# Patient Record
Sex: MALE | Race: AMERICAN INDIAN OR ALASKA NATIVE
[De-identification: names, ages, dates, MRNs, and addresses within clinical notes are randomized per-mention and may not be internally consistent; named-entity substitution may affect disease eponyms.]

---

## 2018-07-11 ENCOUNTER — HOSPITAL ENCOUNTER (EMERGENCY)
Dept: HOSPITAL 5 - ED | Age: 58
Discharge: HOME | End: 2018-07-11
Payer: COMMERCIAL

## 2018-07-11 VITALS — DIASTOLIC BLOOD PRESSURE: 89 MMHG | SYSTOLIC BLOOD PRESSURE: 131 MMHG

## 2018-07-11 DIAGNOSIS — I10: ICD-10-CM

## 2018-07-11 DIAGNOSIS — M62.838: Primary | ICD-10-CM

## 2018-07-11 DIAGNOSIS — Z87.891: ICD-10-CM

## 2018-07-11 LAB
BUN SERPL-MCNC: 18 MG/DL (ref 9–20)
BUN/CREAT SERPL: 16 %
CALCIUM SERPL-MCNC: 10.2 MG/DL (ref 8.4–10.2)
HEMOLYSIS INDEX: 19

## 2018-07-11 PROCEDURE — 99283 EMERGENCY DEPT VISIT LOW MDM: CPT

## 2018-07-11 PROCEDURE — 80048 BASIC METABOLIC PNL TOTAL CA: CPT

## 2018-07-11 PROCEDURE — 36415 COLL VENOUS BLD VENIPUNCTURE: CPT

## 2018-07-11 PROCEDURE — 83735 ASSAY OF MAGNESIUM: CPT

## 2018-07-11 NOTE — EMERGENCY DEPARTMENT REPORT
ED General Adult HPI





- General


Chief complaint: Pain General


Stated complaint: FULL BODY SPASM


Time Seen by Provider: 18 12:47


Source: patient


Mode of arrival: Ambulatory


Limitations: No Limitations





- History of Present Illness


Initial comments: 





This is a 58-year-old male here complaining of generalized spasm for 2 days.  

He reports that he has had this in the past and he has been followed by Dr. Nicole who gave him vitamin D.  He said that this is something that he gets 

periodically.  Patient states that he drinks more than 2 L of water daily.  He 

reports that when he has as before he had blood work done at Dr. Nicole's office 

and reports that everything was normal.  Patient states that he does a lot of 

heavy lifting at work and he thinks this was causing this.  Spasms intermittent 

that feels tight and 7 out of 10.  No medication taken prior to coming to the 

emergency room.


MD Complaint: Generalized muscle spasm


Onset/Timin


-: days(s)


Location: neck, back, upper extremity, lower extremity


Radiation: non-radiation


Severity scale (0 -10): 7


Quality: other (tight)


Improves with: rest


Worsens with: movement


Associated Symptoms: denies: confusion, chest pain, cough, diaphoresis, fever/

chills, headaches, loss of appetite, malaise, nausea/vomiting, rash, seizure, 

shortness of breath, syncope, weakness


Treatments Prior to Arrival: none





- Related Data


 Home Medications











 Medication  Instructions  Recorded  Confirmed  Last Taken


 


Lisinopril [Zestril TAB] 10 mg PO QDAY 16








 Previous Rx's











 Medication  Instructions  Recorded  Last Taken  Type


 


Azithromycin [Zithromax Z-RENATA] 1 dose PO DAILY 5 Days  tab 16 Unknown Rx


 


Benzonatate [Tessalon Perles] 100 mg PO Q8HR #30 capsule 16 Unknown Rx


 


Oxycodone HCl/Acetaminophen 1 each PO Q6HR PRN #20 tablet 16 Unknown Rx





[Percocet 10/325 mg]    


 


Cyclobenzaprine [Flexeril] 10 mg PO TID PRN #12 tablet 18 Unknown Rx











 Allergies











Allergy/AdvReac Type Severity Reaction Status Date / Time


 


ibuprofen [From Motrin] Allergy  Nausea Verified 16 08:49














ED Review of Systems


ROS: 


Stated complaint: FULL BODY SPASM


Other details as noted in HPI





Constitutional: denies: chills, fever


Eyes: denies: eye pain, eye discharge, vision change


ENT: denies: ear pain, throat pain


Respiratory: denies: cough, shortness of breath, SOB with exertion, SOB at rest

, stridor, wheezing


Cardiovascular: denies: chest pain, palpitations, edema, syncope


Endocrine: denies: excessive sweating, increased hunger, increased thirst, 

unexplained weight gain


Gastrointestinal: denies: abdominal pain, nausea, vomiting, diarrhea, 

constipation, hematemesis, melena, hematochezia


Genitourinary: denies: urgency, dysuria, frequency, hematuria, discharge


Musculoskeletal: denies: back pain, joint swelling, arthralgia


Skin: denies: rash, lesions


Neurological: denies: headache, weakness, numbness, paresthesias, confusion, 

abnormal gait, vertigo


Psychiatric: denies: anxiety, depression





ED Past Medical Hx





- Past Medical History


Previous Medical History?: Yes


Hx Hypertension: Yes


Hx Arthritis: Yes





- Surgical History


Past Surgical History?: No





- Family History


Family history: hypertension





- Social History


Smoking Status: Former Smoker


Substance Use Type: Prescribed





- Medications


Home Medications: 


 Home Medications











 Medication  Instructions  Recorded  Confirmed  Last Taken  Type


 


Azithromycin [Zithromax Z-RENATA] 1 dose PO DAILY 5 Days  tab 16  Unknown Rx


 


Benzonatate [Tessalon Perles] 100 mg PO Q8HR #30 capsule 16  Unknown Rx


 


Lisinopril [Zestril TAB] 10 mg PO QDAY 16 History


 


Oxycodone HCl/Acetaminophen 1 each PO Q6HR PRN #20 tablet 16  Unknown Rx





[Percocet 10/325 mg]     


 


Cyclobenzaprine [Flexeril] 10 mg PO TID PRN #12 tablet 18  Unknown Rx














ED Physical Exam





- General


Limitations: No Limitations


General appearance: alert, in no apparent distress





- Head


Head exam: Present: atraumatic, normocephalic, normal inspection





- Eye


Eye exam: Present: normal appearance, PERRL, EOMI


Pupils: Present: normal accommodation





- ENT


ENT exam: Present: normal exam, normal orophraynx, mucous membranes moist, TM's 

normal bilaterally, normal external ear exam





- Neck


Neck exam: Present: normal inspection, full ROM, other (no C-spine tenderness).

  Absent: tenderness, lymphadenopathy





- Respiratory


Respiratory exam: Present: normal lung sounds bilaterally.  Absent: respiratory 

distress, chest wall tenderness





- Cardiovascular


Cardiovascular Exam: Present: regular rate, normal rhythm, normal heart sounds.

  Absent: systolic murmur, diastolic murmur





- GI/Abdominal


GI/Abdominal exam: Present: soft, normal bowel sounds.  Absent: distended, 

tenderness, guarding, rebound, rigid, organomegaly, mass, bruit, pulsatile mass

, hernia





- Extremities Exam


Extremities exam: Present: normal inspection, full ROM, normal capillary refill

, other (no clubbing, cyanosis or edema.  +2 pulses.  Extremities and no 

neurovascular compromise).  Absent: tenderness, pedal edema, joint swelling, 

calf tenderness





- Back Exam


Back exam: Present: normal inspection, full ROM, other (ambulates without any 

difficulties).  Absent: tenderness, CVA tenderness (R), CVA tenderness (L), 

muscle spasm, paraspinal tenderness, vertebral tenderness, rash noted





- Neurological Exam


Neurological exam: Present: alert, oriented X3, normal gait, reflexes normal, 

other (No focal neurological deficit).  Absent: motor sensory deficit





- Psychiatric


Psychiatric exam: Present: normal affect, normal mood





- Skin


Skin exam: Present: warm, dry, intact, normal color.  Absent: rash





ED Course


 Vital Signs











  18





  11:14


 


Temperature 98 F


 


Pulse Rate 87


 


Respiratory 20





Rate 


 


Blood Pressure 131/89


 


O2 Sat by Pulse 99





Oximetry 














- Reevaluation(s)


Reevaluation #1: 





18 14:23


Patient received normal saline 1 L emergency room and he is feeling better.





ED Medical Decision Making





- Lab Data


Result diagrams: 


 18 12:55








 Lab Results











  18 Range/Units





  12:55 


 


Sodium  135 L  (137-145)  mmol/L


 


Potassium  4.7  (3.6-5.0)  mmol/L


 


Chloride  93.8 L  ()  mmol/L


 


Carbon Dioxide  25  (22-30)  mmol/L


 


Anion Gap  21  mmol/L


 


BUN  18  (9-20)  mg/dL


 


Creatinine  1.1  (0.8-1.5)  mg/dL


 


Estimated GFR  > 60  ml/min


 


BUN/Creatinine Ratio  16  %


 


Glucose  111 H  ()  mg/dL


 


Calcium  10.2  (8.4-10.2)  mg/dL


 


Magnesium  1.80  (1.7-2.3)  mg/dL














- Medical Decision Making





ED course:





He  reports that he is having generalized spasm 2 days.  He reports that he 

lives heavy objects at work and thinks that this why he is having spasm.  He's 

had similar incident in the past .  Follow-up by Dr. Nicole who is his primary 

care physician.  Here to be evaluated





Patient was seen and examined by myself .  BMP stable except sodium is 135 

which is mildly decrease, magnesium is normal.  I discussed the patient's 

diagnosis and treatment plan he was understanding and he is feeling better 

after IV fluid.





A/P


1: Spasm, Generalized-hydrated with normal saline 1 L and studies: Better.   

will discharge home on Flexeril





Patient headache and the lab work, diagnosis, treatment plan and medication.  

He was understanding





Patient discharged home in stable condition to follow up with his PCP in 2 

days.  VS stable and is afebrile.  Patient studies: Better.  I discussed with 

him to his condition worsens to return to the emergency room and he was 

understanding.  Patient discharged home in stable condition with prescription 

for Flexeril and to increase his fluid intake.








- Differential Diagnosis


electrolyte imbalance, generalized muscle spasm


Critical care attestation.: 


If time is entered above; I have spent that time in minutes in the direct care 

of this critically ill patient, excluding procedure time.








ED Disposition


Clinical Impression: 


 Spasm of muscle





Disposition: DC-01 TO HOME OR SELFCARE


Is pt being admited?: No


Does the pt Need Aspirin: No


Condition: Stable


Instructions:  Muscle Spasm (ED)


Additional Instructions: 


Please follow-up with your primary care physician in 2 days


Take Flexeril as instructed as to not drive or operate heavy machinery while 

taking this medication as causes drowsiness


return to  emergency room if his symptoms worsen or returns.


Prescriptions: 


Cyclobenzaprine [Flexeril] 10 mg PO TID PRN #12 tablet


 PRN Reason: Muscle Spasm


Referrals: 


PRIMARY CARE,MD [Primary Care Provider] - 18


Forms:  Work/School Release Form(ED)

## 2020-11-17 ENCOUNTER — HOSPITAL ENCOUNTER (EMERGENCY)
Dept: HOSPITAL 5 - ED | Age: 60
Discharge: LEFT BEFORE DISCHARGE | End: 2020-11-17
Payer: SELF-PAY

## 2020-11-17 VITALS — DIASTOLIC BLOOD PRESSURE: 109 MMHG | SYSTOLIC BLOOD PRESSURE: 169 MMHG

## 2020-11-17 DIAGNOSIS — Z88.8: ICD-10-CM

## 2020-11-17 DIAGNOSIS — Z79.899: ICD-10-CM

## 2020-11-17 DIAGNOSIS — J45.909: Primary | ICD-10-CM

## 2020-11-17 DIAGNOSIS — I10: ICD-10-CM

## 2020-11-17 DIAGNOSIS — M19.90: ICD-10-CM

## 2020-11-17 PROCEDURE — 99282 EMERGENCY DEPT VISIT SF MDM: CPT

## 2020-11-17 PROCEDURE — 94640 AIRWAY INHALATION TREATMENT: CPT

## 2020-11-17 NOTE — EMERGENCY DEPARTMENT REPORT
ED Shortness of Breath HPI





- General


Chief Complaint: Dyspnea/Respdistress


Stated Complaint: LT ARM PAIN/BREATHING PROBLEM


Time Seen by Provider: 11/17/20 16:44


Source: patient


Mode of arrival: Ambulatory


Limitations: No Limitations





- History of Present Illness


Initial Comments: 





60-year-old  American male presents emergency department complaining of 1 week 

history of progressive worsening shortness of breath and running out of his 

asthma medication at home reporting having a productive cough with wheezing.  


MD Complaint: shortness of breath


-: week(s) (1)


Severity: mild


Improves With: nothing


Worsens With: nothing


Associated Symptoms: cough





- Related Data


                                Home Medications











 Medication  Instructions  Recorded  Confirmed  Last Taken


 


lisinopriL [Zestril TAB] 10 mg PO QDAY 01/27/16 01/27/16 01/27/16








                                  Previous Rx's











 Medication  Instructions  Recorded  Last Taken  Type


 


Azithromycin [Zithromax Z-RENATA] 1 dose PO DAILY 5 Days  tab 01/27/16 Unknown Rx


 


Benzonatate [Tessalon Perles] 100 mg PO Q8HR #30 capsule 01/27/16 Unknown Rx


 


Oxycodone HCl/Acetaminophen 1 each PO Q6HR PRN #20 tablet 01/27/16 Unknown Rx





[Percocet 10/325 mg]    


 


Cyclobenzaprine [Flexeril] 10 mg PO TID PRN #12 tablet 07/11/18 Unknown Rx


 


Albuterol Mdi (or & Nicu Only) 1 puff IH Q4-6H PRN #1 inha 11/17/20 Unknown Rx





[ProAir HFA Inhaler]    


 


Azithromycin [Zithromax] 500 mg PO QDAY #5 tablet 11/17/20 Unknown Rx


 


Benzonatate [Tessalon Perles] 100 mg PO Q8HR #20 capsule 11/17/20 Unknown Rx


 


predniSONE [Deltasone] 50 mg PO QDAY #5 tab 11/17/20 Unknown Rx











                                    Allergies











Allergy/AdvReac Type Severity Reaction Status Date / Time


 


ibuprofen [From Motrin] Allergy  Nausea Verified 01/27/16 08:49














ED Review of Systems


ROS: 


Stated complaint: LT ARM PAIN/BREATHING PROBLEM


Other details as noted in HPI





Comment: All other systems reviewed and negative





ED Past Medical Hx





- Past Medical History


Hx Hypertension: Yes


Hx Arthritis: Yes





- Social History


Smoking Status: Unknown if ever smoked


Substance Use Type: None





- Medications


Home Medications: 


                                Home Medications











 Medication  Instructions  Recorded  Confirmed  Last Taken  Type


 


Azithromycin [Zithromax Z-RENATA] 1 dose PO DAILY 5 Days  tab 01/27/16  Unknown Rx


 


Benzonatate [Tessalon Perles] 100 mg PO Q8HR #30 capsule 01/27/16  Unknown Rx


 


Oxycodone HCl/Acetaminophen 1 each PO Q6HR PRN #20 tablet 01/27/16  Unknown Rx





[Percocet 10/325 mg]     


 


lisinopriL [Zestril TAB] 10 mg PO QDAY 01/27/16 01/27/16 01/27/16 History


 


Cyclobenzaprine [Flexeril] 10 mg PO TID PRN #12 tablet 07/11/18  Unknown Rx


 


Albuterol Mdi (or & Nicu Only) 1 puff IH Q4-6H PRN #1 inha 11/17/20  Unknown Rx





[ProAir HFA Inhaler]     


 


Azithromycin [Zithromax] 500 mg PO QDAY #5 tablet 11/17/20  Unknown Rx


 


Benzonatate [Tessalon Perles] 100 mg PO Q8HR #20 capsule 11/17/20  Unknown Rx


 


predniSONE [Deltasone] 50 mg PO QDAY #5 tab 11/17/20  Unknown Rx














ED Physical Exam





- General


Limitations: No Limitations


General appearance: alert, in no apparent distress





- Head


Head exam: Present: atraumatic, normocephalic





- Eye


Eye exam: Present: normal appearance





- ENT


ENT exam: Present: mucous membranes moist





- Neck


Neck exam: Present: normal inspection





- Respiratory


Respiratory exam: Present: normal lung sounds bilaterally, wheezes, rhonchi.  

Absent: respiratory distress, chest wall tenderness, decreased breath sounds





- Cardiovascular


Cardiovascular Exam: Present: regular rate, normal rhythm.  Absent: systolic 

murmur, diastolic murmur, rubs, gallop





- GI/Abdominal


GI/Abdominal exam: Present: soft, normal bowel sounds





- Rectal


Rectal exam: Present: deferred





- Extremities Exam


Extremities exam: Present: normal inspection





- Back Exam


Back exam: Present: normal inspection





- Neurological Exam


Neurological exam: Present: alert, oriented X3, CN II-XII intact, normal gait





- Psychiatric


Psychiatric exam: Present: normal affect, normal mood.  Absent: anxious, flat 

affect





- Skin


Skin exam: Present: warm, dry, intact, normal color.  Absent: rash, cyanosis, 

diaphoretic, erythema, petechiae, pallor, abrasion





ED Course





                                   Vital Signs











  11/17/20





  16:54


 


Temperature 98.0 F


 


Pulse Rate 79


 


Respiratory 20





Rate 


 


Blood Pressure 169/109





[Left] 


 


O2 Sat by Pulse 97





Oximetry 














ED Medical Decision Making





- Medical Decision Making





This patient presents with acute cough, most consistent with anxiety/asthma. 

Differential diagnosis includes bronchitis, asthma, pneumonia, hyperreactive 

airways. Presentation not consistent with acute bacterial pneumonia, influenza, 

asthma, transient airway hyperresponsiveness. Presentation not consistent with 

chronic causes of cough (including GERD, asthma, postnasal discharge, medication

 side effect, CHF, lung cancer or mass).





Plan: , supportive care, reassess


Critical care attestation.: 


If time is entered above; I have spent that time in minutes in the direct care 

of this critically ill patient, excluding procedure time.








ED Disposition


Clinical Impression: 


 Asthmatic bronchitis





Is pt being admited?: No


Does the pt Need Aspirin: No


Condition: Stable


Instructions:  Chronic Bronchitis (ED), Asthma, Adult, Bronchospasm, Adult, 

Cough, Adult, Easy-to-Read, How to Use a Metered Dose Inhaler, How to Use a 

Nebulizer, Adult


Referrals: 


PRIMARY CARE,MD [Primary Care Provider] - 3-5 Days


GENOVEVA FISCHER MD [Staff Physician] - 3-5 Days

## 2020-12-08 ENCOUNTER — HOSPITAL ENCOUNTER (INPATIENT)
Dept: HOSPITAL 5 - ED | Age: 60
LOS: 5 days | Discharge: HOME | DRG: 291 | End: 2020-12-13
Attending: INTERNAL MEDICINE | Admitting: INTERNAL MEDICINE
Payer: COMMERCIAL

## 2020-12-08 DIAGNOSIS — R73.9: ICD-10-CM

## 2020-12-08 DIAGNOSIS — F17.210: ICD-10-CM

## 2020-12-08 DIAGNOSIS — I11.0: Primary | ICD-10-CM

## 2020-12-08 DIAGNOSIS — G89.4: ICD-10-CM

## 2020-12-08 DIAGNOSIS — Z91.14: ICD-10-CM

## 2020-12-08 DIAGNOSIS — I42.9: ICD-10-CM

## 2020-12-08 DIAGNOSIS — Z88.6: ICD-10-CM

## 2020-12-08 DIAGNOSIS — I50.21: ICD-10-CM

## 2020-12-08 DIAGNOSIS — M19.90: ICD-10-CM

## 2020-12-08 DIAGNOSIS — J91.8: ICD-10-CM

## 2020-12-08 DIAGNOSIS — J45.909: ICD-10-CM

## 2020-12-08 LAB
ALBUMIN SERPL-MCNC: 3.5 G/DL (ref 3.9–5)
ALT SERPL-CCNC: 49 UNITS/L (ref 7–56)
BASOPHILS # (AUTO): 0 K/MM3 (ref 0–0.1)
BASOPHILS NFR BLD AUTO: 0.8 % (ref 0–1.8)
BUN SERPL-MCNC: 10 MG/DL (ref 9–20)
BUN/CREAT SERPL: 10 %
CALCIUM SERPL-MCNC: 9.5 MG/DL (ref 8.4–10.2)
EOSINOPHIL # BLD AUTO: 0.1 K/MM3 (ref 0–0.4)
EOSINOPHIL NFR BLD AUTO: 1.9 % (ref 0–4.3)
HCT VFR BLD CALC: 43 % (ref 35.5–45.6)
HEMOLYSIS INDEX: 8
HGB BLD-MCNC: 14 GM/DL (ref 11.8–15.2)
LYMPHOCYTES # BLD AUTO: 1 K/MM3 (ref 1.2–5.4)
LYMPHOCYTES NFR BLD AUTO: 18.3 % (ref 13.4–35)
MCHC RBC AUTO-ENTMCNC: 33 % (ref 32–34)
MCV RBC AUTO: 88 FL (ref 84–94)
MONOCYTES # (AUTO): 0.5 K/MM3 (ref 0–0.8)
MONOCYTES % (AUTO): 8.9 % (ref 0–7.3)
PLATELET # BLD: 166 K/MM3 (ref 140–440)
RBC # BLD AUTO: 4.86 M/MM3 (ref 3.65–5.03)

## 2020-12-08 PROCEDURE — 99406 BEHAV CHNG SMOKING 3-10 MIN: CPT

## 2020-12-08 PROCEDURE — 93306 TTE W/DOPPLER COMPLETE: CPT

## 2020-12-08 PROCEDURE — 36415 COLL VENOUS BLD VENIPUNCTURE: CPT

## 2020-12-08 PROCEDURE — 80048 BASIC METABOLIC PNL TOTAL CA: CPT

## 2020-12-08 PROCEDURE — 83880 ASSAY OF NATRIURETIC PEPTIDE: CPT

## 2020-12-08 PROCEDURE — 96374 THER/PROPH/DIAG INJ IV PUSH: CPT

## 2020-12-08 PROCEDURE — A9502 TC99M TETROFOSMIN: HCPCS

## 2020-12-08 PROCEDURE — 90686 IIV4 VACC NO PRSV 0.5 ML IM: CPT

## 2020-12-08 PROCEDURE — 83036 HEMOGLOBIN GLYCOSYLATED A1C: CPT

## 2020-12-08 PROCEDURE — G0378 HOSPITAL OBSERVATION PER HR: HCPCS

## 2020-12-08 PROCEDURE — 93005 ELECTROCARDIOGRAM TRACING: CPT

## 2020-12-08 PROCEDURE — 96375 TX/PRO/DX INJ NEW DRUG ADDON: CPT

## 2020-12-08 PROCEDURE — 78452 HT MUSCLE IMAGE SPECT MULT: CPT

## 2020-12-08 PROCEDURE — 93017 CV STRESS TEST TRACING ONLY: CPT

## 2020-12-08 PROCEDURE — 93970 EXTREMITY STUDY: CPT

## 2020-12-08 PROCEDURE — 80061 LIPID PANEL: CPT

## 2020-12-08 PROCEDURE — 71046 X-RAY EXAM CHEST 2 VIEWS: CPT

## 2020-12-08 PROCEDURE — 85610 PROTHROMBIN TIME: CPT

## 2020-12-08 PROCEDURE — 80053 COMPREHEN METABOLIC PANEL: CPT

## 2020-12-08 PROCEDURE — 84484 ASSAY OF TROPONIN QUANT: CPT

## 2020-12-08 PROCEDURE — 85007 BL SMEAR W/DIFF WBC COUNT: CPT

## 2020-12-08 PROCEDURE — 85025 COMPLETE CBC W/AUTO DIFF WBC: CPT

## 2020-12-08 NOTE — HISTORY AND PHYSICAL REPORT
History of Present Illness


Date of examination: 12/08/20


Date of admission: 


12/08/20 21:40





Chief complaint: 





Shortness of Breath


Cough


History of present illness: 





60-year-old -American male with known history of hypertension presenting 

to the emergency room today complaining of shortness of breath, cough and lower 

extremity swelling which has been ongoing for about 2 weeks.  Symptoms were said

to have gotten worse over the past few days and decided to report to the 

emergency room.  Cough has been nonproductive.  He denies any chest pain, no 

fever or chills, no headache or dizziness, no nausea vomiting, no abdominal 

pain, no hematuria or dysuria.  He denies any sick contacts and no recent 

travel, denies any contact with anyone with COVID-19.


Patient has been having orthopnea and has had to prop himself up on pillows at 

home in order to feel comfortable.  He has been wheezing occasionally.





Work-up in the emergency room today reveals elevated BNP, chest x-ray showed 

cardiomegaly with pulmonary vascular congestion, EKG shows sinus tachycardia.





Patient has been admitted for CHF which appears to be new onset.





Past History


Past Medical History: arthritis, hypertension


Past Surgical History: No surgical history


Social history: smoking (1 Pack per day)


Family history: no significant family history





Medications and Allergies


                                    Allergies











Allergy/AdvReac Type Severity Reaction Status Date / Time


 


ibuprofen [From Motrin] Allergy  Nausea Verified 01/27/16 08:49











                                Home Medications











 Medication  Instructions  Recorded  Confirmed  Last Taken  Type


 


Azithromycin [Zithromax Z-RENATA] 1 dose PO DAILY 5 Days  tab 01/27/16  Unknown Rx


 


Benzonatate [Tessalon Perles] 100 mg PO Q8HR #30 capsule 01/27/16  Unknown Rx


 


Oxycodone HCl/Acetaminophen 1 each PO Q6HR PRN #20 tablet 01/27/16  Unknown Rx





[Percocet 10/325 mg]     


 


lisinopriL [Zestril TAB] 10 mg PO QDAY 01/27/16 01/27/16 01/27/16 History


 


Cyclobenzaprine [Flexeril] 10 mg PO TID PRN #12 tablet 07/11/18  Unknown Rx


 


Albuterol Mdi (or & Nicu Only) 1 puff IH Q4-6H PRN #1 inha 11/17/20  Unknown Rx





[ProAir HFA Inhaler]     


 


Azithromycin [Zithromax] 500 mg PO QDAY #5 tablet 11/17/20  Unknown Rx


 


Benzonatate [Tessalon Perles] 100 mg PO Q8HR #20 capsule 11/17/20  Unknown Rx


 


predniSONE [Deltasone] 50 mg PO QDAY #5 tab 11/17/20  Unknown Rx














Review of Systems


Constitutional: no fever, no chills


Ears, nose, mouth and throat: no nasal congestion, no sore throat


Cardiovascular: orthopnea, no chest pain, no palpitations


Respiratory: cough, shortness of breath


Genitourinary Male: no dysuria, no hematuria, no nocturia


Musculoskeletal: no neck pain, no low back pain


Integumentary: no rash, no pruritis


Neurological: no headaches, no convulsions, no confusion


Psychiatric: no anxiety, no depression





Exam





- Constitutional


Vitals: 


                                        











Temp Pulse Resp BP Pulse Ox


 


 98.1 F   110 H  20   132/97   100 


 


 12/08/20 17:49  12/08/20 17:49  12/08/20 17:49  12/08/20 17:49  12/08/20 17:49











General appearance: Present: no acute distress, well-nourished





- EENT


Eyes: Present: PERRL, EOM intact.  Absent: scleral icterus


ENT: hearing intact, clear oral mucosa, dentition normal





- Neck


Neck: Present: supple, normal ROM





- Respiratory


Respiratory effort: normal


Respiratory: bilateral: rales





- Cardiovascular


Rhythm: regular


Heart Sounds: Present: S1 & S2.  Absent: gallop, systolic murmur, diastolic 

murmur, rub





- Extremities


Extremities: no ischemia, pulses intact, pulses symmetrical, Full ROM


Extremity abnormal: edema (2+ bilateral lower extremity edema)


Peripheral Pulses: within normal limits





- Abdominal


General gastrointestinal: Present: soft, non-tender, non-distended, normal bowel

 sounds.  Absent: mass





- Integumentary


Integumentary: Present: clear, warm, dry





- Musculoskeletal


Musculoskeletal: strength equal bilaterally





- Psychiatric


Psychiatric: appropriate mood/affect, intact judgment & insight, memory intact, 

cooperative





- Neurologic


Neurologic: CNII-XII intact, no focal deficits, moves all extremities





HEART Score





- HEART Score


Troponin: 


                                        











Troponin T  0.027 ng/mL (0.00-0.029)   12/08/20  21:03    














Results





- Labs


CBC & Chem 7: 


                                 12/08/20 19:03





                                 12/08/20 19:03


Labs: 


                              Abnormal lab results











  12/08/20 12/08/20 Range/Units





  19:03 19:03 


 


RDW  15.9 H   (13.2-15.2)  %


 


Mono % (Auto)  8.9 H   (0.0-7.3)  %


 


Lymph # (Auto)  1.0 L   (1.2-5.4)  K/mm3


 


Seg Neutrophils %  70.1 H   (40.0-70.0)  %


 


Potassium   5.2 H  (3.6-5.0)  mmol/L


 


AST   41 H  (5-40)  units/L


 


NT-Pro-B Natriuret Pep   2831 H  (0-900)  pg/mL


 


Total Protein   5.9 L  (6.3-8.2)  g/dL


 


Albumin   3.5 L  (3.9-5)  g/dL














Assessment and Plan





- Patient Problems


(1) Acute CHF (congestive heart failure)


Current Visit: Yes   Status: Acute   


Qualifiers: 


   Heart failure type: unspecified   Qualified Code(s): I50.9 - Heart failure, 

unspecified   


Plan to address problem: 


Patient placed on diuretics will monitor input and output and also monitor daily

 weights.  We will schedule patient for echocardiogram.


We will request cardiology evaluation and recommendation.








(2) Hypertension


Current Visit: Yes   Status: Acute   


Plan to address problem: 


Monitor vital signs closely.  We will continue routine home medications once 

reconciled.








(3) DVT prophylaxis


Current Visit: Yes   Status: Acute   


Plan to address problem: 


Patient placed on subcutaneous heparin.








(4) Full code status


Current Visit: Yes   Status: Acute

## 2020-12-08 NOTE — EMERGENCY DEPARTMENT REPORT
ED Shortness of Breath HPI





- General


Chief Complaint: Adult Asthma


Stated Complaint: SOB


Source: patient


Mode of arrival: Ambulatory


Limitations: No Limitations





- History of Present Illness


Initial Comments: 





Patient is a 60-year-old -American male with a history of hypertension, 

chronic osteoarthritis, tobacco abuse and chronic bronchitis who presents to the

ED with complaint of acute onset persistent shortness of breath, persistent dry 

cough with wheezing, bilateral lower extremity swelling and pain for the last 2 

weeks, worse in the last 5 days.  Patient states that any movement or exertion 

makes the shortness of breath worse as well as laying flat on the bed.  Patient 

states that laying down flat in the bed makes breathing difficult so he has to 

be upright or prop himself with many pillows in the house in order to sleep.  

Patient denies dizziness, syncope, chest pain, fever, chills, nausea, vomiting, 

change in vision, palpitations, seizures, abdominal pain, diarrhea, sore throat,

headache or hematuria or dysuria.


MD Complaint: shortness of breath, cough


-: Sudden, week(s) (2)


Radiation: other


Severity: severe


Pain Scale: 7 (Bilateral lower extremity swelling and pain)


Quality: other (Chest pressure)


Consistency: constant


Improves With: nothing


Worsens With: lying flat, exertion, movement


Known History Of: COPD, congestive heart failure


Context: smoke/fume exposure


Associated Symptoms: cough, lower extremity pain (And swelling), other 

(Orthopnea)


Treatments Prior to Arrival: none





- Related Data


Home Oxygen Therapy: No


                                Home Medications











 Medication  Instructions  Recorded  Confirmed  Last Taken


 


lisinopriL [Zestril TAB] 10 mg PO QDAY 16








                                  Previous Rx's











 Medication  Instructions  Recorded  Last Taken  Type


 


Azithromycin [Zithromax Z-RENATA] 1 dose PO DAILY 5 Days  tab 16 Unknown Rx


 


Benzonatate [Tessalon Perles] 100 mg PO Q8HR #30 capsule 16 Unknown Rx


 


Oxycodone HCl/Acetaminophen 1 each PO Q6HR PRN #20 tablet 16 Unknown Rx





[Percocet 10/325 mg]    


 


Cyclobenzaprine [Flexeril] 10 mg PO TID PRN #12 tablet 18 Unknown Rx


 


Albuterol Mdi (or & Nicu Only) 1 puff IH Q4-6H PRN #1 inha 20 Unknown Rx





[ProAir HFA Inhaler]    


 


Azithromycin [Zithromax] 500 mg PO QDAY #5 tablet 20 Unknown Rx


 


Benzonatate [Tessalon Perles] 100 mg PO Q8HR #20 capsule 20 Unknown Rx


 


predniSONE [Deltasone] 50 mg PO QDAY #5 tab 20 Unknown Rx











                                    Allergies











Allergy/AdvReac Type Severity Reaction Status Date / Time


 


ibuprofen [From Motrin] Allergy  Nausea Verified 16 08:49














ED Review of Systems


ROS: 


Stated complaint: SOB


Other details as noted in HPI





Constitutional: malaise.  denies: chills, fever


Eyes: denies: eye pain, eye discharge, vision change


ENT: denies: ear pain, throat pain


Respiratory: cough, orthopnea, shortness of breath, SOB with exertion, wheezing


Cardiovascular: dyspnea on exertion, orthopnea, paroxysmal nocturnal dyspnea.  

denies: chest pain, palpitations


Endocrine: no symptoms reported


Gastrointestinal: denies: abdominal pain, nausea, vomiting, diarrhea


Genitourinary: denies: urgency, dysuria


Musculoskeletal: other (Bilateral lower extremity pain and swelling).  denies: 

back pain, joint swelling, arthralgia


Skin: denies: rash, lesions


Neurological: denies: headache, weakness, paresthesias


Psychiatric: denies: anxiety, depression


Hematological/Lymphatic: denies: easy bleeding, easy bruising





ED Past Medical Hx





- Past Medical History


Previous Medical History?: Yes


Hx Hypertension: Yes


Hx Arthritis: Yes





- Social History


Smoking Status: Never Smoker


Substance Use Type: None





- Medications


Home Medications: 


                                Home Medications











 Medication  Instructions  Recorded  Confirmed  Last Taken  Type


 


Azithromycin [Zithromax Z-RENATA] 1 dose PO DAILY 5 Days  tab 16  Unknown Rx


 


Benzonatate [Tessalon Perles] 100 mg PO Q8HR #30 capsule 16  Unknown Rx


 


Oxycodone HCl/Acetaminophen 1 each PO Q6HR PRN #20 tablet 16  Unknown Rx





[Percocet 10/325 mg]     


 


lisinopriL [Zestril TAB] 10 mg PO QDAY 16 History


 


Cyclobenzaprine [Flexeril] 10 mg PO TID PRN #12 tablet 18  Unknown Rx


 


Albuterol Mdi (or & Nicu Only) 1 puff IH Q4-6H PRN #1 inha 20  Unknown Rx





[ProAir HFA Inhaler]     


 


Azithromycin [Zithromax] 500 mg PO QDAY #5 tablet 20  Unknown Rx


 


Benzonatate [Tessalon Perles] 100 mg PO Q8HR #20 capsule 20  Unknown Rx


 


predniSONE [Deltasone] 50 mg PO QDAY #5 tab 20  Unknown Rx














ED Physical Exam





- General


Limitations: No Limitations


General appearance: alert, in no apparent distress





- Head


Head exam: Present: atraumatic, normocephalic, normal inspection





- Eye


Eye exam: Present: normal appearance, PERRL, EOMI





- ENT


ENT exam: Present: normal exam, normal orophraynx, mucous membranes moist, TM's 

normal bilaterally, normal external ear exam





- Neck


Neck exam: Present: normal inspection, full ROM





- Respiratory


Respiratory exam: Present: wheezes (Moderately diffuse coarse wheezes 

throughout).  Absent: respiratory distress, rales, rhonchi, stridor, chest wall 

tenderness, accessory muscle use, decreased breath sounds, prolonged expiratory





- Cardiovascular


Cardiovascular Exam: Present: normal rhythm, tachycardia, normal heart sounds.  

Absent: systolic murmur, diastolic murmur, rubs, gallop





- GI/Abdominal


GI/Abdominal exam: Present: soft, normal bowel sounds.  Absent: tenderness, 

guarding, rebound, hyperactive bowel sounds, hypoactive bowel sounds, 

organomegaly





- Extremities Exam


Extremities exam: Present: full ROM, tenderness (Palpable moderate bilateral 

calf tenderness), normal capillary refill, pedal edema (2+ bilateral lower 

extremity edema), joint swelling, calf tenderness (Bilateral calf tenderness 

moderately)





- Back Exam


Back exam: Present: normal inspection, full ROM.  Absent: tenderness, CVA 

tenderness (R), CVA tenderness (L), muscle spasm, paraspinal tenderness, 

vertebral tenderness





- Neurological Exam


Neurological exam: Present: alert, oriented X3, CN II-XII intact, normal gait, 

reflexes normal





- Psychiatric


Psychiatric exam: Present: normal affect, normal mood





- Skin


Skin exam: Present: warm, dry, intact, normal color.  Absent: rash





ED Course


                                   Vital Signs











  20





  17:49


 


Temperature 98.1 F


 


Pulse Rate 110 H


 


Respiratory 20





Rate 


 


Blood Pressure 132/97





[Right] 


 


O2 Sat by Pulse 100





Oximetry 














ED Medical Decision Making





- Lab Data


Result diagrams: 


                                 20 19:03





                                 20 19:03





- EKG Data


EKG shows normal: sinus rhythm


Rate: tachycardia





- EKG Data


Interpretation: no acute changes, normal EKG





20 21:17


The EKG shows sinus tachycardia with a ventricular rate of 108 bpm, with 

probable left atrial enlargement, anterior infarct which is old, QT of 340





- Radiology Data


Radiology results: report reviewed, image reviewed





Findings





[facility] 


[address] [place] [address] 





XRay Report 


Signed 





 Patient: ULISES CASH MR#: [id number] 


 435 


 : 1960 Acct:[id number] 





 Age/Sex: 60 / M ADM Date: 20 





 Loc: ED 


 Attending Dr: 








 Ordering Physician: CAITLIN VICENTE 


 Date of Service: 20 


 Procedure(s): XR chest routine 2V 


 Accession Number(s): [id number] 





 cc: CAITLIN VICENTE 





 Fluoro Time In Minutes: 





 XR chest routine 2V 





INDICATION / CLINICAL INFORMATION: DYSPNEA. 





COMPARISON: None available. 





FINDINGS: 





SUPPORT DEVICES: None. 


HEART /PULMONARY VASCULATURE: The cardiac silhouette is enlarged with mild 

prominence of the 


 pulmonary vasculature appear 


LUNGS / PLEURA: Trace bibasilar pleural effusions. No airspace consolidation is 

identified. No 


 pneumothorax. 





ADDITIONAL FINDINGS: No significant additional findings. 





IMPRESSION: 


Cardiomegaly with mild prominence of the pulmonary vasculature, may reflect 

early CHF. Trace 


 bibasilar pleural effusions are nonspecific. No evidence of pneumonia. 





Signer Name: Ronaldo Conti MD 


Signed: 2020 7:39 PM 


Workstation Name: [id number] 








 Transcribed By: EMMA 


 Dictated By: RONALDO CONTI MD 


 Electronically Authenticated By: RONALDO CONTI MD 


 Signed Date/Time: 20 











 DD/DT: 20 


 TD/TT: 








---------

--------------------------------------------------------------------------------


-------------------------------------------------------


Findings





[facility] 


[address] [place] [address] 





Vascular Lab Report 


Signed 





 Patient: ULISES CASH MR#: [id number] 


 435 


 : 1960 Acct:[id number] 





 Age/Sex: 60 / M ADM Date: 20 





 Loc: ED 


 Attending Dr: 








 Ordering Physician: CAITLIN AHUMADA 


 Date of Service: 20 


 Procedure(s): VL venous duplex LE BILAT 


 Accession Number(s): [id number] 





 cc: CAITLIN AHUMADA 








 DUPLEX DOPPLER LOWER EXTREMITY VEINS, BILATERAL 





INDICATION / CLINICAL INFORMATION: 


Bilateral LE swelling and pain. 





TECHNIQUE: 


Duplex doppler imaging was performed through the veins of both lower extremities

 using venous 


 compression and other maneuvers. 





COMPARISON: 


None available. 





FINDINGS: 


RIGHT COMMON FEMORAL VEIN: Negative. 


RIGHT FEMORAL VEIN: Negative. 


RIGHT POPLITEAL VEIN: Negative. 


RIGHT CALF VEINS: Negative. 





LEFT COMMON FEMORAL VEIN: Negative. 


LEFT FEMORAL VEIN: Negative. 


LEFT POPLITEAL VEIN: Negative. 


LEFT CALF VEINS: Negative. 





ADDITIONAL FINDINGS: Bilateral lower extremity edema. 





IMPRESSION: 


1. No sonographic evidence for DVT in either lower extremity. 





Signer Name: Davina Grayson MD 


Signed: 2020 9:09 PM 


Workstation Name: [id number] 








 Transcribed By: RH 


 Dictated By: DAVINA GRAYSON III 


 Electronically Authenticated By: DAVINA GRAYSON III 


 Signed Date/Time: 20 











 DD/DT: 20 


 TD/TT: 











- Medical Decision Making





This is a 60-year-old -American male with a history of hypertension, 

chronic osteoarthritis, tobacco abuse and chronic bronchitis who presents to the

 ED with complaint of acute onset persistent shortness of breath, persistent dry

 cough with wheezing, bilateral lower extremity swelling and pain for the last 2

 weeks, worse in the last 5 days.  Patient states that any movement or exertion 

makes the shortness of breath worse as well as laying flat on the bed.  Patient 

states that laying down flat in the bed makes breathing difficult so he has to 

be upright or prop himself with many pillows in the house in order to sleep.  In

 the ED, patient is alert and oriented x3 and is not in distress.  Patient is 

however afebrile and tachycardic in triage.  Chest x-ray showed cardiomegaly 

with mild prominence of the pulmonary vasculature, may reflect early CHF. Trace 

bibasilar pleural effusions are nonspecific. No evidence of pneumonia.  Lab test

 results were reviewed and showed BNP of 2831, initial troponin of 0.021, mild 

hyerkalemia of 5.2 which may be due to hemolysis, and AST of 41.  Patient was 

initially treated in the ED with aspirin, Solu-Medrol, DuoNeb and Lasix.  The 

bilateral lower extremity Doppler ultrasound showed no sonographic evidence for 

DVT in either lower extremity.  The EKG shows sinus tachycardia with a 

ventricular rate of 108 bpm, with probable left atrial enlargement, anterior 

infarct which is old, QT of 340.  Patient care was discussed with the 

hospitalist physician Dr. Fernández who evaluated the patient and admitted the 

patient to the hospital.





- Differential Diagnosis


CHF; pneumonia; bronchitis; ACS; asthma; DVT; PE


Critical Care Time: Yes


Critical care time in (mins) excluding proc time.: 40


Critical care attestation.: 


If time is entered above; I have spent that time in minutes in the direct care 

of this critically ill patient, excluding procedure time.








ED Disposition


Clinical Impression: 


 SOB (shortness of breath) on exertion, Pedal edema





Acute CHF (congestive heart failure)


Qualifiers:


 Heart failure type: unspecified Qualified Code(s): I50.9 - Heart failure, 

unspecified





Disposition:  OP ADMIT IP TO THIS HOSP


Is pt being admited?: Yes


Does the pt Need Aspirin: No


Condition: Stable


Instructions:  Shortness of Breath, Adult, Easy-to-Read


Referrals: 


PRIMARY CARE,MD [Primary Care Provider] - 3-5 Days


Time of Disposition: 21:16


Print Language: ENGLISH

## 2020-12-08 NOTE — VASCULAR LAB REPORT
DUPLEX DOPPLER LOWER EXTREMITY VEINS, BILATERAL



INDICATION / CLINICAL INFORMATION:

Bilateral LE swelling and pain.



TECHNIQUE:

Duplex doppler imaging was performed through the veins of both lower extremities using venous charity
allan and other maneuvers.



COMPARISON: 

None available.



FINDINGS:

RIGHT COMMON FEMORAL VEIN: Negative.

RIGHT FEMORAL VEIN: Negative.

RIGHT POPLITEAL VEIN: Negative.

RIGHT CALF VEINS: Negative.



LEFT COMMON FEMORAL VEIN: Negative.

LEFT FEMORAL VEIN: Negative.

LEFT POPLITEAL VEIN: Negative.

LEFT CALF VEINS: Negative.



ADDITIONAL FINDINGS: Bilateral lower extremity edema.



IMPRESSION:

1. No sonographic evidence for DVT in either lower extremity.



Signer Name: Yoel Grayson MD 

Signed: 12/8/2020 9:09 PM

Workstation Name: [id number]

## 2020-12-08 NOTE — EMERGENCY DEPARTMENT REPORT
Blank Doc





- Documentation


Documentation: 





60-year-old F American male past medical history of hypertension and asthma pr

esents emerged department complaining of a few day history of waxing and waning 

but progressively worsening shortness of breath associated with chest pain and 

dyspnea.  States has been experiencing some some wheezing which she thinks may 

be residual from her recent bronchitis that was diagnosed a couple of weeks ago 

but he was unable to complete his medication overall requirements due to a lack 

of insurance so thinks that those symptoms had worsened he also has began to 

develop some lower extremity swelling to his legs pop bilaterally but did have 

an episode of chest pain and numbness to the left arm as well.








This initial assessment/diagnostic orders/clinical plan/treatment(s) is/are 

subject to change based on patients health status, clinical progression and re-

assessment by fellow clinical providers in the ED. Further treatment and workup 

at subsequent clinical providers discretion. Patient/guardian urged not to elope

from the ED as their condition may be serious if not clinically assessed and 

managed. 





Initial orders include: 


EKG, chest x-ray, labs

## 2020-12-08 NOTE — XRAY REPORT
XR chest routine 2V



INDICATION / CLINICAL INFORMATION: DYSPNEA.



COMPARISON: None available.



FINDINGS:



SUPPORT DEVICES: None.

HEART /PULMONARY VASCULATURE: The cardiac silhouette is enlarged with mild prominence of the pulmonar
y vasculature appear

LUNGS / PLEURA: Trace bibasilar pleural effusions. No airspace consolidation is identified. No pneumo
thorax. 



ADDITIONAL FINDINGS: No significant additional findings.



IMPRESSION:

Cardiomegaly with mild prominence of the pulmonary vasculature, may reflect early CHF. Trace bibasila
r pleural effusions are nonspecific. No evidence of pneumonia.



Signer Name: Gerhard Conti MD 

Signed: 12/8/2020 7:39 PM

Workstation Name: [email or web address]

## 2020-12-09 LAB
BAND NEUTROPHILS # (MANUAL): 0 K/MM3
BUN SERPL-MCNC: 13 MG/DL (ref 9–20)
BUN/CREAT SERPL: 14 %
CALCIUM SERPL-MCNC: 9.1 MG/DL (ref 8.4–10.2)
HCT VFR BLD CALC: 39.7 % (ref 35.5–45.6)
HEMOLYSIS INDEX: 10
HGB BLD-MCNC: 13.1 GM/DL (ref 11.8–15.2)
INR PPP: 0.99 (ref 0.87–1.13)
MCHC RBC AUTO-ENTMCNC: 33 % (ref 32–34)
MCV RBC AUTO: 87 FL (ref 84–94)
MYELOCYTES # (MANUAL): 0 K/MM3
PLATELET # BLD: 169 K/MM3 (ref 140–440)
PROMYELOCYTES # (MANUAL): 0 K/MM3
RBC # BLD AUTO: 4.54 M/MM3 (ref 3.65–5.03)
TOTAL CELLS COUNTED BLD: 100

## 2020-12-09 RX ADMIN — Medication SCH ML: at 11:42

## 2020-12-09 RX ADMIN — HEPARIN SODIUM SCH UNIT: 5000 INJECTION, SOLUTION INTRAVENOUS; SUBCUTANEOUS at 13:06

## 2020-12-09 RX ADMIN — OXYCODONE AND ACETAMINOPHEN PRN TAB: 5; 325 TABLET ORAL at 23:57

## 2020-12-09 RX ADMIN — HEPARIN SODIUM SCH UNIT: 5000 INJECTION, SOLUTION INTRAVENOUS; SUBCUTANEOUS at 21:39

## 2020-12-09 RX ADMIN — OXYCODONE AND ACETAMINOPHEN PRN TAB: 5; 325 TABLET ORAL at 17:28

## 2020-12-09 RX ADMIN — Medication SCH ML: at 21:39

## 2020-12-09 RX ADMIN — FUROSEMIDE SCH MG: 10 INJECTION, SOLUTION INTRAVENOUS at 05:55

## 2020-12-09 RX ADMIN — METOPROLOL SUCCINATE SCH MG: 50 TABLET, EXTENDED RELEASE ORAL at 11:43

## 2020-12-09 RX ADMIN — HEPARIN SODIUM SCH UNIT: 5000 INJECTION, SOLUTION INTRAVENOUS; SUBCUTANEOUS at 05:55

## 2020-12-09 RX ADMIN — BENZONATATE SCH MG: 100 CAPSULE ORAL at 13:06

## 2020-12-09 RX ADMIN — FUROSEMIDE SCH MG: 10 INJECTION, SOLUTION INTRAVENOUS at 17:21

## 2020-12-09 RX ADMIN — BENZONATATE SCH MG: 100 CAPSULE ORAL at 21:39

## 2020-12-09 NOTE — PROGRESS NOTE
Assessment and Plan


Assessment and plan: 


60-year-old -American male with known history of hypertension presenting 

to the emergency room today complaining of shortness of breath, cough and lower 

extremity swelling which has been ongoing for about 2 weeks.  Symptoms were said

to have gotten worse over the past few days and decided to report to the 

emergency room.  Cough has been nonproductive.  He denies any chest pain, no 

fever or chills, no headache or dizziness, no nausea vomiting, no abdominal 

pain, no hematuria or dysuria.  He denies any sick contacts and no recent 

travel, denies any contact with anyone with COVID-19.


Patient has been having orthopnea and has had to prop himself up on pillows at 

home in order to feel comfortable.  He has been wheezing occasionally.


Per patient he recently lost 40 pounds.  Diet and exercise but states that he 

feels this more due to his medical condition.  On admission his chest x-ray 

showed cardiomegaly with mild pulmonary vascular congestion and a proBNP of 2831


Work-up in the emergency room today reveals elevated BNP, chest x-ray showed 

cardiomegaly with pulmonary vascular congestion, EKG shows sinus tachycardia.





Acute congestive heart failure presumed systolic


Hypertension


Elevated blood sugar


Nausea and vomiting 


Medical noncompliance


Chronic pain syndrome


Weight loss of 40 pounds





Plan


Continue goal-directed medical therapy


Continue Lasix


Await echo


Cardiology input noted


Outpatient GI evaluation for age-appropriate colonoscopy


Continue home medication


Check A1c to ensure no underlying diabetes mellitus


DVT and GI prophylaxis





History


Interval history: 


Patient seen and examined reports some improvement but not yet at his baseline 

still with some shortness of breath and leg swelling also complains of chronic 

pain.  Reports some nausea and some tongue discoloration.  Informs me that he 

was recommended to have colonoscopy but unfortunately did not get to do due to 

his own negligence.








Hospitalist Physical





- Physical exam


Narrative exam: 


VITAL SIGNS:  Reviewed.    Exam limited due to the global pandemic and effort to

preserve PPE


GENERAL:  The patient appears normally developed, Vital signs as documented.


HEAD:  No signs of head trauma.


EYES:  Pupils are equal.  Extraocular motions intact.  


EARS:  Hearing grossly intact.


MOUTH:  Oropharynx is normal except for missing dentition. 


NECK:  No adenopathy, no JVD.  


CHEST:  Chest with diminished breath sounds bilaterally.  No wheezes, rales, or 

rhonchi.  


CARDIAC:  Regular rate and rhythm.  S1 and S2, without murmurs, gallops, or 

rubs.


VASCULAR: +2 edema.  Peripheral pulses normal and equal in all extremities.


ABDOMEN:  Soft, non tender and non distended.  No   rebound or guarding, and no 

masses palpated.   Bowel Sounds normal.


MUSCULOSKELETAL:  Good range of motion of all major joints. Extremities without 

clubbing, cyanosis.  +2 pitting edema bilateral lower extremity.  


NEUROLOGIC EXAM:  Alert and oriented x 3   No focal sensory or strength 

deficits.   Speech normal.  Follows commands.


PSYCHIATRIC:  Mood normal.


SKIN:  detail exam as documented in skin assessment








- Constitutional


Vitals: 


                                        











Temp Pulse Resp BP Pulse Ox


 


 98.0 F   100 H  20   145/93   98 


 


 12/09/20 05:47  12/09/20 05:47  12/09/20 05:47  12/09/20 05:47  12/09/20 05:47











General appearance: Present: no acute distress





HEART Score





- HEART Score


Troponin: 


                                        











Troponin T  0.027 ng/mL (0.00-0.029)   12/08/20  21:03    














Results





- Labs


CBC & Chem 7: 


                                 12/09/20 07:17





                                 12/09/20 07:17


Labs: 


                             Laboratory Last Values











WBC  5.0 K/mm3 (4.5-11.0)   12/09/20  07:17    


 


RBC  4.54 M/mm3 (3.65-5.03)   12/09/20  07:17    


 


Hgb  13.1 gm/dl (11.8-15.2)   12/09/20  07:17    


 


Hct  39.7 % (35.5-45.6)   12/09/20  07:17    


 


MCV  87 fl (84-94)   12/09/20  07:17    


 


MCH  29 pg (28-32)   12/09/20  07:17    


 


MCHC  33 % (32-34)   12/09/20  07:17    


 


RDW  15.4 % (13.2-15.2)  H  12/09/20  07:17    


 


Plt Count  169 K/mm3 (140-440)   12/09/20  07:17    


 


Lymph % (Auto)  18.3 % (13.4-35.0)   12/08/20  19:03    


 


Mono % (Auto)  8.9 % (0.0-7.3)  H  12/08/20  19:03    


 


Eos % (Auto)  1.9 % (0.0-4.3)   12/08/20  19:03    


 


Baso % (Auto)  0.8 % (0.0-1.8)   12/08/20  19:03    


 


Lymph # (Auto)  1.0 K/mm3 (1.2-5.4)  L  12/08/20  19:03    


 


Mono # (Auto)  0.5 K/mm3 (0.0-0.8)   12/08/20  19:03    


 


Eos # (Auto)  0.1 K/mm3 (0.0-0.4)   12/08/20  19:03    


 


Baso # (Auto)  0.0 K/mm3 (0.0-0.1)   12/08/20  19:03    


 


Add Manual Diff  Complete   12/09/20  07:17    


 


Total Counted  100   12/09/20  07:17    


 


Seg Neutrophils %  Np   12/09/20  07:17    


 


Seg Neuts % (Manual)  91.0 % (40.0-70.0)  H  12/09/20  07:17    


 


Band Neutrophils %  0 %  12/09/20  07:17    


 


Lymphocytes % (Manual)  9.0 % (13.4-35.0)  L  12/09/20  07:17    


 


Reactive Lymphs % (Man)  0 %  12/09/20  07:17    


 


Monocytes % (Manual)  0 % (0.0-7.3)   12/09/20  07:17    


 


Eosinophils % (Manual)  0 % (0.0-4.3)   12/09/20  07:17    


 


Basophils % (Manual)  0 % (0.0-1.8)   12/09/20  07:17    


 


Metamyelocytes %  0 %  12/09/20  07:17    


 


Myelocytes %  0 %  12/09/20  07:17    


 


Promyelocytes %  0 %  12/09/20  07:17    


 


Blast Cells %  0 %  12/09/20  07:17    


 


Nucleated RBC %  Not Reportable   12/09/20  07:17    


 


Seg Neutrophils #  4.0 K/mm3 (1.8-7.7)   12/08/20  19:03    


 


Seg Neutrophils # Man  4.6 K/mm3 (1.8-7.7)   12/09/20  07:17    


 


Band Neutrophils #  0.0 K/mm3  12/09/20  07:17    


 


Lymphocytes # (Manual)  0.5 K/mm3 (1.2-5.4)  L  12/09/20  07:17    


 


Abs React Lymphs (Man)  0.0 K/mm3  12/09/20  07:17    


 


Monocytes # (Manual)  0.0 K/mm3 (0.0-0.8)   12/09/20  07:17    


 


Eosinophils # (Manual)  0.0 K/mm3 (0.0-0.4)   12/09/20  07:17    


 


Basophils # (Manual)  0.0 K/mm3 (0.0-0.1)   12/09/20  07:17    


 


Metamyelocytes #  0.0 K/mm3  12/09/20  07:17    


 


Myelocytes #  0.0 K/mm3  12/09/20  07:17    


 


Promyelocytes #  0.0 K/mm3  12/09/20  07:17    


 


Blast Cells #  0.0 K/mm3  12/09/20  07:17    


 


WBC Morphology  Not Reportable   12/09/20  07:17    


 


Hypersegmented Neuts  Not Reportable   12/09/20  07:17    


 


Hyposegmented Neuts  Not Reportable   12/09/20  07:17    


 


Hypogranular Neuts  Not Reportable   12/09/20  07:17    


 


Smudge Cells  Not Reportable   12/09/20  07:17    


 


Toxic Granulation  Not Reportable   12/09/20  07:17    


 


Toxic Vacuolation  Not Reportable   12/09/20  07:17    


 


Dohle Bodies  Not Reportable   12/09/20  07:17    


 


Pelger-Huet Anomaly  Not Reportable   12/09/20  07:17    


 


Vernell Rods  Not Reportable   12/09/20  07:17    


 


Platelet Estimate  Consistent w auto   12/09/20  07:17    


 


Clumped Platelets  Not Reportable   12/09/20  07:17    


 


Plt Clumps, EDTA  Not Reportable   12/09/20  07:17    


 


Large Platelets  Not Reportable   12/09/20  07:17    


 


Giant Platelets  Not Reportable   12/09/20  07:17    


 


Platelet Satelliting  Not Reportable   12/09/20  07:17    


 


Plt Morphology Comment  Not Reportable   12/09/20  07:17    


 


RBC Morphology  Normal   12/09/20  07:17    


 


Dimorphic RBCs  Not Reportable   12/09/20  07:17    


 


Polychromasia  Not Reportable   12/09/20  07:17    


 


Hypochromasia  Not Reportable   12/09/20  07:17    


 


Poikilocytosis  Not Reportable   12/09/20  07:17    


 


Anisocytosis  Not Reportable   12/09/20  07:17    


 


Microcytosis  Not Reportable   12/09/20  07:17    


 


Macrocytosis  Not Reportable   12/09/20  07:17    


 


Spherocytes  Not Reportable   12/09/20  07:17    


 


Pappenheimer Bodies  Not Reportable   12/09/20  07:17    


 


Sickle Cells  Not Reportable   12/09/20  07:17    


 


Target Cells  Not Reportable   12/09/20  07:17    


 


Tear Drop Cells  Not Reportable   12/09/20  07:17    


 


Ovalocytes  Not Reportable   12/09/20  07:17    


 


Helmet Cells  Not Reportable   12/09/20  07:17    


 


[place] Bodies  Not Reportable   12/09/20  07:17    


 


Cabot Rings  Not Reportable   12/09/20  07:17    


 


Connor Cells  Not Reportable   12/09/20  07:17    


 


Bite Cells  Not Reportable   12/09/20  07:17    


 


Crenated Cell  Not Reportable   12/09/20  07:17    


 


Elliptocytes  Not Reportable   12/09/20  07:17    


 


Acanthocytes (Spur)  Not Reportable   12/09/20  07:17    


 


Rouleaux  Not Reportable   12/09/20  07:17    


 


Hemoglobin C Crystals  Not Reportable   12/09/20  07:17    


 


Schistocytes  Not Reportable   12/09/20  07:17    


 


Malaria parasites  Not Reportable   12/09/20  07:17    


 


Ruddy Bodies  Not Reportable   12/09/20  07:17    


 


Hem Pathologist Commnt  No   12/09/20  07:17    


 


PT  13.0 Sec. (12.2-14.9)   12/09/20  07:17    


 


INR  0.99  (0.87-1.13)   12/09/20  07:17    


 


Sodium  139 mmol/L (137-145)   12/09/20  07:17    


 


Potassium  4.9 mmol/L (3.6-5.0)   12/09/20  07:17    


 


Chloride  102.6 mmol/L ()   12/09/20  07:17    


 


Carbon Dioxide  25 mmol/L (22-30)   12/09/20  07:17    


 


Anion Gap  16 mmol/L  12/09/20  07:17    


 


BUN  13 mg/dL (9-20)   12/09/20  07:17    


 


Creatinine  0.9 mg/dL (0.8-1.3)   12/09/20  07:17    


 


Estimated GFR  > 60 ml/min  12/09/20  07:17    


 


BUN/Creatinine Ratio  14 %  12/09/20  07:17    


 


Glucose  243 mg/dL ()  H  12/09/20  07:17    


 


Calcium  9.1 mg/dL (8.4-10.2)   12/09/20  07:17    


 


Total Bilirubin  0.30 mg/dL (0.1-1.2)   12/08/20  19:03    


 


AST  41 units/L (5-40)  H  12/08/20  19:03    


 


ALT  49 units/L (7-56)   12/08/20  19:03    


 


Alkaline Phosphatase  93 units/L ()   12/08/20  19:03    


 


Troponin T  0.027 ng/mL (0.00-0.029)   12/08/20  21:03    


 


NT-Pro-B Natriuret Pep  2831 pg/mL (0-900)  H  12/08/20  19:03    


 


Total Protein  5.9 g/dL (6.3-8.2)  L  12/08/20  19:03    


 


Albumin  3.5 g/dL (3.9-5)  L  12/08/20  19:03    


 


Albumin/Globulin Ratio  1.5 %  12/08/20  19:03    











[place]/IV: 


                                        





Voiding Method                   Urinal


IV Catheter Type [Left           INT / Saline Lock


Antecubital]                     











Active Medications





- Current Medications


Current Medications: 














Generic Name Dose Route Start Last Admin





  Trade Name Freq  PRN Reason Stop Dose Admin


 


Acetaminophen  650 mg  12/08/20 22:39 





  Tylenol  PO  





  Q4H PRN  





  Pain MILD(1-3)/Fever >100.5/HA  


 


Aspirin  81 mg  12/10/20 10:00 





  Baby Aspirin  PO  





  QDAY GARRY  


 


Benzonatate  100 mg  12/09/20 14:00 





  Tessalon Perles  PO  





  Q8HR GARRY  


 


Cyclobenzaprine HCl  10 mg  12/09/20 11:42 





  Flexeril  PO  





  TID PRN  





  Muscle Spasm  


 


Diphenhydramine HCl  25 mg  12/09/20 01:27  12/09/20 01:55





  Benadryl  PO   25 mg





  QHS PRN   Administration





  Sleep  


 


Furosemide  40 mg  12/09/20 06:00  12/09/20 05:55





  Lasix  IV   40 mg





  BID@0600,1800 GARRY   Administration


 


Heparin Sodium (Porcine)  5,000 unit  12/09/20 06:00  12/09/20 05:55





  Heparin  SUB-Q   5,000 unit





  Q8HR GARRY   Administration


 


Losartan Potassium  25 mg  12/10/20 10:00 





  Cozaar  PO  





  QDAY [facility]  


 


Magnesium Hydroxide  30 ml  12/08/20 22:39 





  Milk Of Magnesia  PO  





  Q4H PRN  





  Constipation  


 


Metoprolol Succinate  50 mg  12/09/20 11:00 





  Metoprolol Xl  PO  





  QDAY [facility]  


 


Miscellaneous Medication  1 each  12/09/20 11:42 





  Oxycodone Hcl/Acetaminophen [Percocet 10/325 Mg]  PO  





  Q6HR PRN  





  PAIN  


 


Ondansetron HCl  4 mg  12/08/20 22:39 





  Zofran  IV  





  Q8H PRN  





  Nausea And Vomiting  


 


Sodium Chloride  10 ml  12/09/20 10:00 





  Sodium Chloride Flush Syringe 10 Ml  IV  





  BID [facility]  


 


Sodium Chloride  10 ml  12/08/20 22:39 





  Sodium Chloride Flush Syringe 10 Ml  IV  





  PRN PRN  





  LINE FLUSH

## 2020-12-09 NOTE — CONSULTATION
History of Present Illness


Consult date: 12/09/20


Requesting physician: HARSHA ZHOU


Consult reason: congestive heart failure


History of present illness: 


The pt is a 59 YO male with a past medical history of HTN and arthritis. He is 

previously unknown to our practice. His PCP is Dr. Nicole. He presented with c/o 

progressively worsening SOB, LÓPEZ, PND and BLE edema for 1 month. He states that 

he has been somewhat noncompliant with his home BP medication, only takes it 

intermittently. He recently lost 40lbs via diet and exercise and states that 

approx 1 month ago, he began experiencing SOB and LÓPEZ while trying to walk and 

run. He denies any chest pain, palpitations, n/v, diaphoresis, dizziness or 

syncope. He denies any known prior cardiac issues or cardiac w/u. Labwork is 

significant for pro-BNP 2831, CXR with cardiomegaly and very mild pulmonary 

vascular congestion. 








Past History


Past Medical History: arthritis, hypertension


Past Surgical History: No surgical history


Social history: smoking (1 Pack per day)


Family history: no significant family history





Medications and Allergies


                                    Allergies











Allergy/AdvReac Type Severity Reaction Status Date / Time


 


ibuprofen [From Motrin] Allergy  Nausea Verified 01/27/16 08:49











                                Home Medications











 Medication  Instructions  Recorded  Confirmed  Last Taken  Type


 


Azithromycin [Zithromax Z-RENATA] 1 dose PO DAILY 5 Days  tab 01/27/16  Unknown Rx


 


Benzonatate [Tessalon Perles] 100 mg PO Q8HR #30 capsule 01/27/16  Unknown Rx


 


Oxycodone HCl/Acetaminophen 1 each PO Q6HR PRN #20 tablet 01/27/16  Unknown Rx





[Percocet 10/325 mg]     


 


lisinopriL [Zestril TAB] 10 mg PO QDAY 01/27/16 01/27/16 01/27/16 History


 


Cyclobenzaprine [Flexeril] 10 mg PO TID PRN #12 tablet 07/11/18  Unknown Rx


 


Albuterol Mdi (or & Nicu Only) 1 puff IH Q4-6H PRN #1 inha 11/17/20  Unknown Rx





[ProAir HFA Inhaler]     


 


Azithromycin [Zithromax] 500 mg PO QDAY #5 tablet 11/17/20  Unknown Rx


 


Benzonatate [Tessalon Perles] 100 mg PO Q8HR #20 capsule 11/17/20  Unknown Rx


 


predniSONE [Deltasone] 50 mg PO QDAY #5 tab 11/17/20  Unknown Rx











Active Meds: 


Active Medications





Acetaminophen (Tylenol)  650 mg PO Q4H PRN


   PRN Reason: Pain MILD(1-3)/Fever >100.5/HA


Diphenhydramine HCl (Benadryl)  25 mg PO QHS PRN


   PRN Reason: Sleep


   Last Admin: 12/09/20 01:55 Dose:  25 mg


   Documented by: 


Furosemide (Lasix)  40 mg IV BID@0600,1800 [facility]


   Last Admin: 12/09/20 05:55 Dose:  40 mg


   Documented by: 


Heparin Sodium (Porcine) (Heparin)  5,000 unit SUB-Q Q8HR [facility]


   Last Admin: 12/09/20 05:55 Dose:  5,000 unit


   Documented by: 


Magnesium Hydroxide (Milk Of Magnesia)  30 ml PO Q4H PRN


   PRN Reason: Constipation


Ondansetron HCl (Zofran)  4 mg IV Q8H PRN


   PRN Reason: Nausea And Vomiting


Sodium Chloride (Sodium Chloride Flush Syringe 10 Ml)  10 ml IV BID [facility]


Sodium Chloride (Sodium Chloride Flush Syringe 10 Ml)  10 ml IV PRN PRN


   PRN Reason: LINE FLUSH











Review of Systems


Constitutional: no fever, no chills, no sweats


Ears, nose, mouth and throat: no ear pain, no nose pain, no sinus pressure, no 

sinus pain


Cardiovascular: orthopnea, edema, shortness of breath, dyspnea on exertion, 

paroxysmal nocturnal dyspnea, high blood pressure, leg edema, decreased exercise

 tolerance, no chest pain, no palpitations, no rapid/irregular heart beat, no 

syncope, no lightheadedness


Respiratory: shortness of breath, dyspnea on exertion, no cough, no congestion, 

no wheezing, no pain on inspiration


Gastrointestinal: no abdominal pain, no nausea, no vomiting, no diarrhea, no 

constipation, no change in bowel habits


Genitourinary Male: no dysuria, no hematuria, no flank pain, no discharge, no 

urinary frequency, no urinary hesitancy


Musculoskeletal: no neck stiffness, no neck pain, no shooting arm pain, no arm 

numbness/tingling, no low back pain, no shooting leg pain


Integumentary: no rash, no pruritis, no redness, no sores, no wounds


Neurological: no head injury, no paralysis, no weakness, no parathesias, no 

numbness, no tingling, no seizures, no syncope


Psychiatric: no anxiety


Endocrine: no cold intolerance, no heat intolerance


Hematologic/Lymphatic: no easy bruising


Allergic/Immunologic: no urticaria





Physical Examination


                                   Vital Signs











Temp Pulse Resp BP Pulse Ox


 


 98.1 F   110 H  20   132/97   100 


 


 12/08/20 17:49  12/08/20 17:49  12/08/20 17:49  12/08/20 17:49  12/08/20 17:49











General appearance: no acute distress


HEENT: Positive: PERRL, Normocephaly, Mucus Membranes Moist


Neck: Positive: neck supple, trachea midline


Cardiac: Positive: Reg Rate and Rhythm, S1/S2


Lungs: Positive: Decreased Breath Sounds


Neuro: Positive: Grossly Intact


Abdomen: Negative: Tender


Skin: Negative: Rash


Musculoskeletal: No Pain


Extremities: Present: +2 Edema (BLE)





Results





                                 12/09/20 07:17





                                 12/09/20 07:17


                                 Cardiac Enzymes











  12/08/20 Range/Units





  19:03 


 


AST  41 H  (5-40)  units/L








                                   Coagulation











  12/09/20 Range/Units





  07:17 


 


PT  13.0  (12.2-14.9)  Sec.


 


INR  0.99  (0.87-1.13)  








                                       CBC











  12/08/20 12/09/20 Range/Units





  19:03 07:17 


 


WBC  5.7  5.0  (4.5-11.0)  K/mm3


 


RBC  4.86  4.54  (3.65-5.03)  M/mm3


 


Hgb  14.0  13.1  (11.8-15.2)  gm/dl


 


Hct  43.0  39.7  (35.5-45.6)  %


 


Plt Count  166  169  (140-440)  K/mm3


 


Lymph # (Auto)  1.0 L   (1.2-5.4)  K/mm3


 


Mono # (Auto)  0.5   (0.0-0.8)  K/mm3


 


Eos # (Auto)  0.1   (0.0-0.4)  K/mm3


 


Baso # (Auto)  0.0   (0.0-0.1)  K/mm3








                          Comprehensive Metabolic Panel











  12/08/20 12/09/20 Range/Units





  19:03 07:17 


 


Sodium  142  139  (137-145)  mmol/L


 


Potassium  5.2 H  4.9  (3.6-5.0)  mmol/L


 


Chloride  105.8  102.6  ()  mmol/L


 


Carbon Dioxide  28  25  (22-30)  mmol/L


 


BUN  10  13  (9-20)  mg/dL


 


Creatinine  1.0  0.9  (0.8-1.3)  mg/dL


 


Glucose  100  243 H  ()  mg/dL


 


Calcium  9.5  9.1  (8.4-10.2)  mg/dL


 


AST  41 H   (5-40)  units/L


 


ALT  49   (7-56)  units/L


 


Alkaline Phosphatase  93   ()  units/L


 


Total Protein  5.9 L   (6.3-8.2)  g/dL


 


Albumin  3.5 L   (3.9-5)  g/dL














- Imaging and Cardiology


Echo: pending


EKG: report reviewed, image reviewed





EKG interpretations





- Telemetry


EKG Rhythm: Sinus Rhythm





- EKG


Sinus rhythms and dysrhythmias: sinus rhythm





Assessment and Plan


Initiate GDMT and cont IV lasix BID. F/u BMP in AM. Obtain echo. 


Further recs to follow per hospital course. 





The patient has been seen in conjunction with Dr. Berrios who agrees with the 

assessment and plan of care. 








- Patient Problems


(1) Acute heart failure


Current Visit: Yes   Status: Acute   





(2) Uncontrolled hypertension


Current Visit: Yes   Status: Acute

## 2020-12-10 LAB
BUN SERPL-MCNC: 22 MG/DL (ref 9–20)
BUN/CREAT SERPL: 20 %
CALCIUM SERPL-MCNC: 9 MG/DL (ref 8.4–10.2)
HDLC SERPL-MCNC: 57 MG/DL (ref 40–59)
HEMOLYSIS INDEX: 3

## 2020-12-10 RX ADMIN — ASPIRIN SCH MG: 81 TABLET, CHEWABLE ORAL at 10:02

## 2020-12-10 RX ADMIN — HEPARIN SODIUM SCH UNIT: 5000 INJECTION, SOLUTION INTRAVENOUS; SUBCUTANEOUS at 21:42

## 2020-12-10 RX ADMIN — ONDANSETRON PRN MG: 2 INJECTION INTRAMUSCULAR; INTRAVENOUS at 10:03

## 2020-12-10 RX ADMIN — BENZONATATE SCH MG: 100 CAPSULE ORAL at 13:46

## 2020-12-10 RX ADMIN — OXYCODONE AND ACETAMINOPHEN PRN TAB: 5; 325 TABLET ORAL at 20:15

## 2020-12-10 RX ADMIN — BENZONATATE SCH: 100 CAPSULE ORAL at 13:17

## 2020-12-10 RX ADMIN — BUMETANIDE SCH MG: 0.25 INJECTION, SOLUTION INTRAMUSCULAR; INTRAVENOUS at 17:02

## 2020-12-10 RX ADMIN — BUMETANIDE SCH MG: 0.25 INJECTION, SOLUTION INTRAMUSCULAR; INTRAVENOUS at 13:46

## 2020-12-10 RX ADMIN — HEPARIN SODIUM SCH UNIT: 5000 INJECTION, SOLUTION INTRAVENOUS; SUBCUTANEOUS at 13:46

## 2020-12-10 RX ADMIN — BENZONATATE SCH MG: 100 CAPSULE ORAL at 21:42

## 2020-12-10 RX ADMIN — LOSARTAN POTASSIUM SCH MG: 25 TABLET, FILM COATED ORAL at 10:02

## 2020-12-10 RX ADMIN — FUROSEMIDE SCH: 10 INJECTION, SOLUTION INTRAVENOUS at 13:19

## 2020-12-10 RX ADMIN — ONDANSETRON PRN MG: 2 INJECTION INTRAMUSCULAR; INTRAVENOUS at 21:42

## 2020-12-10 RX ADMIN — Medication SCH ML: at 21:43

## 2020-12-10 RX ADMIN — Medication SCH ML: at 10:03

## 2020-12-10 RX ADMIN — HEPARIN SODIUM SCH: 5000 INJECTION, SOLUTION INTRAVENOUS; SUBCUTANEOUS at 13:17

## 2020-12-10 RX ADMIN — METOPROLOL SUCCINATE SCH MG: 50 TABLET, EXTENDED RELEASE ORAL at 10:03

## 2020-12-10 NOTE — PROGRESS NOTE
Assessment and Plan


Assessment and plan: 


60-year-old -American male with known history of hypertension presenting 

to the emergency room today complaining of shortness of breath, cough and lower 

extremity swelling which has been ongoing for about 2 weeks.  Symptoms were said

to have gotten worse over the past few days and decided to report to the 

emergency room.  Cough has been nonproductive.  He denies any chest pain, no 

fever or chills, no headache or dizziness, no nausea vomiting, no abdominal 

pain, no hematuria or dysuria.  He denies any sick contacts and no recent 

travel, denies any contact with anyone with COVID-19.


Patient has been having orthopnea and has had to prop himself up on pillows at 

home in order to feel comfortable.  He has been wheezing occasionally.


Per patient he recently lost 40 pounds.  Diet and exercise but states that he 

feels this more due to his medical condition.  On admission his chest x-ray 

showed cardiomegaly with mild pulmonary vascular congestion and a proBNP of 2831


Work-up in the emergency room today reveals elevated BNP, chest x-ray showed 

cardiomegaly with pulmonary vascular congestion, EKG shows sinus tachycardia.





12/10: Patient seen and examined, no acute event overnight. TTE reviewed - EF 

25-30%, mod LVH, LA mod dilated, RV systolic function mildly reduced, RV mod 

dilated, RA mod dilated, mod MR, RVSP 48mmHg, trivial pericardial effusion, mod 

pleural effusion. Cardilology input noted with adjustments made to meds. 








Acute congestive heart failure presumed systolic


Hypertension


Elevated blood sugar


Nausea and vomiting 


Medical noncompliance


Chronic pain syndrome


Weight loss of 40 pounds





Plan


Continue goal-directed medical therapy


Cardiology input noted


Outpatient GI evaluation for age-appropriate colonoscopy


Continue home medication


Check A1c to ensure no underlying diabetes mellitus


DVT and GI prophylaxis





History


Interval history: 


Patient seen and examined, Still with some shortness of breath and leg swelling 

and chest congestion feeling





Hospitalist Physical





- Physical exam


Narrative exam: 


VITAL SIGNS:  Reviewed.    Exam limited due to the global pandemic and effort to

preserve PPE


GENERAL:  The patient appears normally developed, Vital signs as documented.


HEAD:  No signs of head trauma.


EYES:  Pupils are equal.  Extraocular motions intact.  


EARS:  Hearing grossly intact.


MOUTH:  Oropharynx is normal except for missing dentition. 


NECK:  No adenopathy, no JVD.  


CHEST:  Chest with diminished breath sounds bilaterally.  No wheezes, rales, or 

rhonchi.  


CARDIAC:  Regular rate and rhythm.  S1 and S2, without murmurs, gallops, or 

rubs.


VASCULAR: +2 edema.  Peripheral pulses normal and equal in all extremities.


ABDOMEN:  Soft, non tender and non distended.  No   rebound or guarding, and no 

masses palpated.   Bowel Sounds normal.


MUSCULOSKELETAL:  Good range of motion of all major joints. Extremities without 

clubbing, cyanosis.  +2 pitting edema bilateral lower extremity.  


NEUROLOGIC EXAM:  Alert and oriented x 3   No focal sensory or strength 

deficits.   Speech normal.  Follows commands.


PSYCHIATRIC:  Mood normal.


SKIN:  detail exam as documented in skin assessment








- Constitutional


Vitals: 


                                        











Temp Pulse Resp BP Pulse Ox


 


 97.3 F L  90   18   121/86   100 


 


 12/10/20 05:10  12/10/20 05:10  12/10/20 05:10  12/10/20 05:10  12/10/20 05:10











General appearance: Present: no acute distress





HEART Score





- HEART Score


Troponin: 


                                        











Troponin T  0.027 ng/mL (0.00-0.029)   12/08/20  21:03    














Results





- Labs


CBC & Chem 7: 


                                 12/09/20 07:17





                                 12/10/20 05:16


Labs: 


                             Laboratory Last Values











WBC  5.0 K/mm3 (4.5-11.0)   12/09/20  07:17    


 


RBC  4.54 M/mm3 (3.65-5.03)   12/09/20  07:17    


 


Hgb  13.1 gm/dl (11.8-15.2)   12/09/20  07:17    


 


Hct  39.7 % (35.5-45.6)   12/09/20  07:17    


 


MCV  87 fl (84-94)   12/09/20  07:17    


 


MCH  29 pg (28-32)   12/09/20  07:17    


 


MCHC  33 % (32-34)   12/09/20  07:17    


 


RDW  15.4 % (13.2-15.2)  H  12/09/20  07:17    


 


Plt Count  169 K/mm3 (140-440)   12/09/20  07:17    


 


Lymph % (Auto)  18.3 % (13.4-35.0)   12/08/20  19:03    


 


Mono % (Auto)  8.9 % (0.0-7.3)  H  12/08/20  19:03    


 


Eos % (Auto)  1.9 % (0.0-4.3)   12/08/20  19:03    


 


Baso % (Auto)  0.8 % (0.0-1.8)   12/08/20  19:03    


 


Lymph # (Auto)  1.0 K/mm3 (1.2-5.4)  L  12/08/20  19:03    


 


Mono # (Auto)  0.5 K/mm3 (0.0-0.8)   12/08/20  19:03    


 


Eos # (Auto)  0.1 K/mm3 (0.0-0.4)   12/08/20  19:03    


 


Baso # (Auto)  0.0 K/mm3 (0.0-0.1)   12/08/20  19:03    


 


Add Manual Diff  Complete   12/09/20  07:17    


 


Total Counted  100   12/09/20  07:17    


 


Seg Neutrophils %  Np   12/09/20  07:17    


 


Seg Neuts % (Manual)  91.0 % (40.0-70.0)  H  12/09/20  07:17    


 


Band Neutrophils %  0 %  12/09/20  07:17    


 


Lymphocytes % (Manual)  9.0 % (13.4-35.0)  L  12/09/20  07:17    


 


Reactive Lymphs % (Man)  0 %  12/09/20  07:17    


 


Monocytes % (Manual)  0 % (0.0-7.3)   12/09/20  07:17    


 


Eosinophils % (Manual)  0 % (0.0-4.3)   12/09/20  07:17    


 


Basophils % (Manual)  0 % (0.0-1.8)   12/09/20  07:17    


 


Metamyelocytes %  0 %  12/09/20  07:17    


 


Myelocytes %  0 %  12/09/20  07:17    


 


Promyelocytes %  0 %  12/09/20  07:17    


 


Blast Cells %  0 %  12/09/20  07:17    


 


Nucleated RBC %  Not Reportable   12/09/20  07:17    


 


Seg Neutrophils #  4.0 K/mm3 (1.8-7.7)   12/08/20  19:03    


 


Seg Neutrophils # Man  4.6 K/mm3 (1.8-7.7)   12/09/20  07:17    


 


Band Neutrophils #  0.0 K/mm3  12/09/20  07:17    


 


Lymphocytes # (Manual)  0.5 K/mm3 (1.2-5.4)  L  12/09/20  07:17    


 


Abs React Lymphs (Man)  0.0 K/mm3  12/09/20  07:17    


 


Monocytes # (Manual)  0.0 K/mm3 (0.0-0.8)   12/09/20  07:17    


 


Eosinophils # (Manual)  0.0 K/mm3 (0.0-0.4)   12/09/20  07:17    


 


Basophils # (Manual)  0.0 K/mm3 (0.0-0.1)   12/09/20  07:17    


 


Metamyelocytes #  0.0 K/mm3  12/09/20  07:17    


 


Myelocytes #  0.0 K/mm3  12/09/20  07:17    


 


Promyelocytes #  0.0 K/mm3  12/09/20  07:17    


 


Blast Cells #  0.0 K/mm3  12/09/20  07:17    


 


WBC Morphology  Not Reportable   12/09/20  07:17    


 


Hypersegmented Neuts  Not Reportable   12/09/20  07:17    


 


Hyposegmented Neuts  Not Reportable   12/09/20  07:17    


 


Hypogranular Neuts  Not Reportable   12/09/20  07:17    


 


Smudge Cells  Not Reportable   12/09/20  07:17    


 


Toxic Granulation  Not Reportable   12/09/20  07:17    


 


Toxic Vacuolation  Not Reportable   12/09/20  07:17    


 


Dohle Bodies  Not Reportable   12/09/20  07:17    


 


Pelger-Huet Anomaly  Not Reportable   12/09/20  07:17    


 


Vernell Rods  Not Reportable   12/09/20  07:17    


 


Platelet Estimate  Consistent w auto   12/09/20  07:17    


 


Clumped Platelets  Not Reportable   12/09/20  07:17    


 


Plt Clumps, EDTA  Not Reportable   12/09/20  07:17    


 


Large Platelets  Not Reportable   12/09/20  07:17    


 


Giant Platelets  Not Reportable   12/09/20  07:17    


 


Platelet Satelliting  Not Reportable   12/09/20  07:17    


 


Plt Morphology Comment  Not Reportable   12/09/20  07:17    


 


RBC Morphology  Normal   12/09/20  07:17    


 


Dimorphic RBCs  Not Reportable   12/09/20  07:17    


 


Polychromasia  Not Reportable   12/09/20  07:17    


 


Hypochromasia  Not Reportable   12/09/20  07:17    


 


Poikilocytosis  Not Reportable   12/09/20  07:17    


 


Anisocytosis  Not Reportable   12/09/20  07:17    


 


Microcytosis  Not Reportable   12/09/20  07:17    


 


Macrocytosis  Not Reportable   12/09/20  07:17    


 


Spherocytes  Not Reportable   12/09/20  07:17    


 


Pappenheimer Bodies  Not Reportable   12/09/20  07:17    


 


Sickle Cells  Not Reportable   12/09/20  07:17    


 


Target Cells  Not Reportable   12/09/20  07:17    


 


Tear Drop Cells  Not Reportable   12/09/20  07:17    


 


Ovalocytes  Not Reportable   12/09/20  07:17    


 


Helmet Cells  Not Reportable   12/09/20  07:17    


 


[place] Bodies  Not Reportable   12/09/20  07:17    


 


Cabot Rings  Not Reportable   12/09/20  07:17    


 


[place] Cells  Not Reportable   12/09/20  07:17    


 


Bite Cells  Not Reportable   12/09/20  07:17    


 


Crenated Cell  Not Reportable   12/09/20  07:17    


 


Elliptocytes  Not Reportable   12/09/20  07:17    


 


Acanthocytes (Spur)  Not Reportable   12/09/20  07:17    


 


Rouleaux  Not Reportable   12/09/20  07:17    


 


Hemoglobin C Crystals  Not Reportable   12/09/20  07:17    


 


Schistocytes  Not Reportable   12/09/20  07:17    


 


Malaria parasites  Not Reportable   12/09/20  07:17    


 


Ruddy Bodies  Not Reportable   12/09/20  07:17    


 


Hem Pathologist Commnt  No   12/09/20  07:17    


 


PT  13.0 Sec. (12.2-14.9)   12/09/20  07:17    


 


INR  0.99  (0.87-1.13)   12/09/20  07:17    


 


Sodium  139 mmol/L (137-145)   12/10/20  05:16    


 


Potassium  4.6 mmol/L (3.6-5.0)   12/10/20  05:16    


 


Chloride  102.1 mmol/L ()   12/10/20  05:16    


 


Carbon Dioxide  31 mmol/L (22-30)  H  12/10/20  05:16    


 


Anion Gap  11 mmol/L  12/10/20  05:16    


 


BUN  22 mg/dL (9-20)  H  12/10/20  05:16    


 


Creatinine  1.1 mg/dL (0.8-1.3)   12/10/20  05:16    


 


Estimated GFR  > 60 ml/min  12/10/20  05:16    


 


BUN/Creatinine Ratio  20 %  12/10/20  05:16    


 


Glucose  114 mg/dL ()  H  12/10/20  05:16    


 


Hemoglobin A1c  6.2 % (4-6)  H  12/09/20  17:11    


 


Calcium  9.0 mg/dL (8.4-10.2)   12/10/20  05:16    


 


Total Bilirubin  0.30 mg/dL (0.1-1.2)   12/08/20  19:03    


 


AST  41 units/L (5-40)  H  12/08/20  19:03    


 


ALT  49 units/L (7-56)   12/08/20  19:03    


 


Alkaline Phosphatase  93 units/L ()   12/08/20  19:03    


 


Troponin T  0.027 ng/mL (0.00-0.029)   12/08/20  21:03    


 


NT-Pro-B Natriuret Pep  2831 pg/mL (0-900)  H  12/08/20  19:03    


 


Total Protein  5.9 g/dL (6.3-8.2)  L  12/08/20  19:03    


 


Albumin  3.5 g/dL (3.9-5)  L  12/08/20  19:03    


 


Albumin/Globulin Ratio  1.5 %  12/08/20  19:03    


 


Triglycerides  135 mg/dL (2-149)   12/10/20  05:16    


 


Cholesterol  178 mg/dL ()   12/10/20  05:16    


 


LDL Cholesterol Direct  121 mg/dL ()   12/10/20  05:16    


 


HDL Cholesterol  57 mg/dL (40-59)   12/10/20  05:16    


 


Cholesterol/HDL Ratio  3.12 %  12/10/20  05:16    














- Diagnostic Impressions


Diagnostic Impressions: 








Echocardiogram  12/08/20 22:40


Transthoracic Echocardiogram


 


Indication:


SOB 


BP:           145/93


HR:           107


 


Conclusions


 *The left ventricular chamber size is normal.


 *Moderate concentric left ventricular hypertrophy is observed.


 *Severe global hypokinesis of the left ventricle is observed.


 *The estimated ejection fraction is 25-30%. 


 *The left ventricular diastolic filling pattern is consistent with


elevated left ventricular end-diastolic pressure.


 *The left atrium is moderately dilated.


 *The right ventricular global systolic function is mildly reduced.


 *The right ventricle is moderately dilated. 


 *The right atrium is moderately dilated. 


 *There is moderate mitral regurgitation. 


 *The right ventricular systolic pressure is calculated at 48 mmHg. 


 *A trivial pericardial effusion is visualized.


 *There is a moderate pleural effusion. 


 *There is less than 50% respiratory change in the inferior vena cava


dimension.


 


Findings     


Left Ventricle:


The left ventricular chamber size is normal. Moderate concentric left


ventricular hypertrophy is observed. Severe global hypokinesis of the


left ventricle is observed. Global left ventricular systolic function is


severely decreased. The estimated ejection fraction is 25-30%.  The left


ventricular diastolic filling pattern is consistent with elevated left


ventricular end-diastolic pressure. 


 


Left Atrium:


The left atrium is moderately dilated. 


 


Right Ventricle:


The right ventricle is moderately dilated.  The right ventricular global


systolic function is mildly reduced. 


 


Right Atrium:


The right atrium is moderately dilated.  


 


Aortic Valve:


The aortic valve leaflets are mildly thickened. There is no evidence of


aortic regurgitation. 


 


Mitral Valve:


The mitral valve leaflets are mildly thickened. There is moderate mitral


regurgitation.  


 


Tricuspid Valve:


The tricuspid valve leaflets are normal.  There is moderate to severe


tricuspid regurgitation. The right ventricular systolic pressure is


calculated at 48 mmHg.  


 


Pulmonic Valve:


The pulmonic valve appears normal. There is trace pulmonic


regurgitation.  


 


Pericardium:


A trivial pericardial effusion is visualized. There is a moderate


pleural effusion.  


 


Venous:


The inferior vena cava is dilated.  There is less than 50% respiratory


change in the inferior vena cava dimension. 


 


Measurements     


Chambers 2D


Name                    Value         Normal Range            


IVSd (2D)               1.41 cm       (0.6 - 1.1)             


LVPWd (2D)              1.4 cm        (0.6 - 1.1)             


LVIDd (2D)              5.35 cm       (3.7 - 5.6)             


LVIDs (2D)              4.85 cm       (2 - 3.8)               


LV FS (2D)              9.19 %        -                        


EF Teichholz (2D)       20.05 %       -                        


Ao root diameter (2D)   3.01 cm       (2 - 3.7)               


 


Volumes/Mass


Name                    Value         Normal Range            


LA ESV SP 4CH (A/L)     95.41 ml      -                        


LA ESV SP 2CH (A/L)     98.82 ml      -                        


LA ESV BP (A/L)         102.49 ml     -                        


LA ESV BP (A/L) index   48.81 ml/m2   -                        


LA ESV SP 4CH (MOD)     86.52 ml      -                        


LA ESV SP 2CH (MOD)     92.96 ml      -                        


LA ESV BP (MOD)         94.59 ml      -                        


LA ESV BP (MOD) index   45.04 ml/m2   -                        


 


Diastolic/Systolic Function


Name                    Value         Normal Range            


MV E-wave Vmax          0.82 m/sec    -                        


MV deceleration time    177.9 msec    -                        


 


Aortic Valve


Name                    Value         Normal Range            


AV Vmax                 1.02 m/sec    -                        


AV VTI                  15.89 cm      -                        


AV peak gradient        4.18 mmHg     -                        


AV mean gradient        2.45 mmHg     -                        


LVOT diameter           2.09 cm       -                        


LVOT Vmax               0.78 m/sec    -                        


LVOT VTI                13.02 cm      -                        


LVOT peak gradient      2.41 mmHg     -                        


LVOT mean gradient      1.44 mmHg     -                        


SV LVOT                 44.71 ml      -                        


EDGAR (continuity Vmax)   2.61 cm2      -                        


EDGAR (continuity VTI)    2.81 cm2      -                        


 


Mitral Valve


Name                    Value         Normal Range            


MR volume (PISA)        22.29 ml      -                        


MR flow (PISA)          83.41 ml/sec  -                        


MR ERO                  0.15 cm2      -                        


MR PISA radius          0.74 cm       -                        


MR alias Vmax           24.57 cm/sec  -                        


 


Tricuspid Valve


Name                    Value         Normal Range            


TR Vmax                 2.91 m/sec    -                        


TR peak gradient        33 mmHg       -                        


RAP                     15 mmHg       -                        


RVSP                    48 mmHg       -                        


IVC diameter            2.45 cm       (1.2 - 2.3)             


 


Pulmonic Valve/Qp:Qs


Name                    Value         Normal Range            


PV Vmax                 0.53 m/sec    -                        


PV peak gradient        1.14 mmHg     -                        


WV end-diastolic Vmax   1.17 m/sec    -                        


PV acceleration time    68.51 msec    -                        


 


Electronically signed by: Bright Berrios MD on 12/09/2020


16:47:39











[place]/IV: 


                                        





Voiding Method                   Toilet


IV Catheter Type [Left           INT / Saline Lock


Antecubital]                     











Active Medications





- Current Medications


Current Medications: 














Generic Name Dose Route Start Last Admin





  Trade Name Freq  PRN Reason Stop Dose Admin


 


Acetaminophen  650 mg  12/08/20 22:39 





  Tylenol  PO  





  Q4H PRN  





  Pain MILD(1-3)/Fever >100.5/HA  


 


Aspirin  81 mg  12/10/20 10:00 





  Baby Aspirin  PO  





  QDAY GARRY  


 


Benzonatate  100 mg  12/09/20 14:00  12/09/20 21:39





  Tessalon Perles  PO   100 mg





  Q8HR GARRY   Administration


 


Cyclobenzaprine HCl  10 mg  12/09/20 11:42 





  Flexeril  PO  





  TID PRN  





  Muscle Spasm  


 


Diphenhydramine HCl  25 mg  12/09/20 01:27  12/09/20 01:55





  Benadryl  PO   25 mg





  QHS PRN   Administration





  Sleep  


 


Furosemide  40 mg  12/09/20 06:00  12/09/20 17:21





  Lasix  IV   40 mg





  BID@0600,1800 GARRY   Administration


 


Heparin Sodium (Porcine)  5,000 unit  12/09/20 06:00  12/09/20 21:39





  Heparin  SUB-Q   5,000 unit





  Q8HR GARRY   Administration


 


Losartan Potassium  25 mg  12/10/20 10:00 





  Cozaar  PO  





  QDAY GARRY  


 


Magnesium Hydroxide  30 ml  12/08/20 22:39 





  Milk Of Magnesia  PO  





  Q4H PRN  





  Constipation  


 


Metoprolol Succinate  50 mg  12/09/20 11:00  12/09/20 11:43





  Metoprolol Xl  PO   50 mg





  QDAY GARRY   Administration


 


Ondansetron HCl  4 mg  12/08/20 22:39 





  Zofran  IV  





  Q8H PRN  





  Nausea And Vomiting  


 


Oxycodone HCl  10 mg  12/09/20 12:37  12/09/20 23:57





  Roxicodone  PO   10 mg





  Q6H PRN   Administration





  Pain, Moderate (4-6)  


 


Oxycodone/Acetaminophen  1 tab  12/09/20 11:49  12/09/20 23:57





  Percocet 5/325  PO   1 tab





  Q6H PRN   Administration





  Pain, Moderate (4-6)  


 


Sodium Chloride  10 ml  12/09/20 10:00  12/09/20 21:39





  Sodium Chloride Flush Syringe 10 Ml  IV   10 ml





  BID GARRY   Administration


 


Sodium Chloride  10 ml  12/08/20 22:39 





  Sodium Chloride Flush Syringe 10 Ml  IV  





  PRN PRN  





  LINE FLUSH  














Nutrition/Malnutrition Assess





- Dietary Evaluation


Nutrition/Malnutrition Findings: 


                                        





Nutrition Notes                                            Start:  12/09/20 

12:58


Freq:                                                      Status: Active       




Protocol:                                                                       




 Document     12/09/20 12:59  AT  (Rec: 12/09/20 13:13  AT  [id number])


 Co-Sign      12/09/20 12:59  MK


 Nutrition Notes


     Need for Assessment generated from:         RN Referral


     Initial or Follow up                        Brief Note


     Current Diagnosis                           Hypertension,Heart Failure


     Other Pertinent Diagnosis                   Edema


     Current Diet                                Cardiac


     Ideal Body Weight (kg)                      0


     Weight Status                               Overweight


     Subjective/Other Information                Consult for chewing difficulty


                                                 , but pt did not have any


                                                 difficulties chewing. Spoke


                                                 with pt over the phone. He is


                                                 eating 100% of meals. Pt


                                                 reports intentional weight


                                                 loss of 9 kg 4 months ago and


                                                 unintentional weight loss of 3


                                                 % in two months (non-


                                                 significant). Pt followed a


                                                 low-sodium diet prior to


                                                 hospital admission. Food


                                                 preferences were recorded.


     Current % PO                                Good (%)


 Nutrition Intervention


     Revisit per MD consult or patient           Sign Off


      request:

## 2020-12-10 NOTE — PROGRESS NOTE
Assessment and Plan


tte reviewed - EF 25-30%, mod LVH, LA mod dilated, RV systolic function mildly 

reduced, RV mod dilated, RA mod dilated, mod MR, RVSP 48mmHg, trivial 

pericardial effusion, mod pleural effusion.


Pt appears to be clinically improving, however, states UOP was diminished 

overnight. Covert IV lasix to IV bumex. 


Cont toprol XL and losartan. 


Plan for lexiscan MPI stress test in AM to evaluate for ICMP. NPO after MN. 





The patient has been seen in conjunction with Dr. Berrios who agrees with the 

assessment and plan of care. 








- Patient Problems


(1) Acute HFrEF (heart failure with reduced ejection fraction)


Current Visit: Yes   Status: Acute   





(2) Cardiomyopathy


Current Visit: Yes   Status: Chronic   





(3) Uncontrolled hypertension


Current Visit: Yes   Status: Acute   





Subjective


Date of service: 12/10/20


Principal diagnosis: HF


Interval history: 


pt sitting up at bedside, BLE edema gradually improving, SOB improving. tele 

reviewed - in SR HR 90s.








Objective


                                        


                                Last Vital Signs











Temp  97.3 F L  12/10/20 05:10


 


Pulse  90   12/10/20 05:10


 


Resp  18   12/10/20 05:10


 


BP  121/86   12/10/20 10:03


 


Pulse Ox  100   12/10/20 05:10














- Physical Examination


General: No Apparent Distress


HEENT: Positive: PERRL, Normocephaly, Mucus Membranes Moist


Neck: Positive: neck supple, trachea midline


Cardiac: Positive: Reg Rate and Rhythm, S1/S2


Lungs: Positive: Decreased Breath Sounds


Neuro: Positive: Grossly Intact


Abdomen: Negative: Tender


Skin: Negative: Rash


Musculoskeletal: No Pain


Extremities: Present: +2 Edema (BLE)





- Labs and Meds


                                     Lipids











  12/10/20 Range/Units





  05:16 


 


Triglycerides  135  (2-149)  mg/dL


 


Cholesterol  178  ()  mg/dL


 


HDL Cholesterol  57  (40-59)  mg/dL


 


Cholesterol/HDL Ratio  3.12  %








                          Comprehensive Metabolic Panel











  12/10/20 Range/Units





  05:16 


 


Sodium  139  (137-145)  mmol/L


 


Potassium  4.6  (3.6-5.0)  mmol/L


 


Chloride  102.1  ()  mmol/L


 


Carbon Dioxide  31 H  (22-30)  mmol/L


 


BUN  22 H  (9-20)  mg/dL


 


Creatinine  1.1  (0.8-1.3)  mg/dL


 


Glucose  114 H  ()  mg/dL


 


Calcium  9.0  (8.4-10.2)  mg/dL














- Imaging and Cardiology


EKG: report reviewed, image reviewed


Echo: pending





- EKG


Sinus rhythms and dysrhythmias: sinus rhythm

## 2020-12-11 LAB
BUN SERPL-MCNC: 23 MG/DL (ref 9–20)
BUN/CREAT SERPL: 18 %
CALCIUM SERPL-MCNC: 8.7 MG/DL (ref 8.4–10.2)
HEMOLYSIS INDEX: 14

## 2020-12-11 RX ADMIN — METOPROLOL SUCCINATE SCH MG: 50 TABLET, EXTENDED RELEASE ORAL at 11:43

## 2020-12-11 RX ADMIN — BUMETANIDE SCH MG: 0.25 INJECTION, SOLUTION INTRAMUSCULAR; INTRAVENOUS at 06:22

## 2020-12-11 RX ADMIN — MEGESTROL ACETATE SCH MG: 40 SUSPENSION ORAL at 14:06

## 2020-12-11 RX ADMIN — ASPIRIN SCH MG: 81 TABLET, CHEWABLE ORAL at 11:42

## 2020-12-11 RX ADMIN — BENZONATATE SCH MG: 100 CAPSULE ORAL at 21:02

## 2020-12-11 RX ADMIN — LOSARTAN POTASSIUM SCH MG: 25 TABLET, FILM COATED ORAL at 11:42

## 2020-12-11 RX ADMIN — Medication SCH ML: at 21:09

## 2020-12-11 RX ADMIN — HEPARIN SODIUM SCH UNIT: 5000 INJECTION, SOLUTION INTRAVENOUS; SUBCUTANEOUS at 13:06

## 2020-12-11 RX ADMIN — BENZONATATE SCH MG: 100 CAPSULE ORAL at 06:23

## 2020-12-11 RX ADMIN — OXYCODONE AND ACETAMINOPHEN PRN TAB: 5; 325 TABLET ORAL at 17:56

## 2020-12-11 RX ADMIN — HEPARIN SODIUM SCH UNIT: 5000 INJECTION, SOLUTION INTRAVENOUS; SUBCUTANEOUS at 06:23

## 2020-12-11 RX ADMIN — Medication SCH ML: at 11:43

## 2020-12-11 RX ADMIN — BENZONATATE SCH MG: 100 CAPSULE ORAL at 13:06

## 2020-12-11 RX ADMIN — ONDANSETRON PRN MG: 2 INJECTION INTRAMUSCULAR; INTRAVENOUS at 21:09

## 2020-12-11 RX ADMIN — HEPARIN SODIUM SCH UNIT: 5000 INJECTION, SOLUTION INTRAVENOUS; SUBCUTANEOUS at 21:02

## 2020-12-11 RX ADMIN — BUMETANIDE SCH MG: 0.25 INJECTION, SOLUTION INTRAMUSCULAR; INTRAVENOUS at 17:54

## 2020-12-11 NOTE — PROGRESS NOTE
Assessment and Plan


S/p lexiscan MPI stress test today which was negative for ischemia. 


Cont toprol XL, losartan, IV bumex. Anticipate d/c within next 24-48Hr. 





The patient has been seen in conjunction with Dr. Berrios who agrees with the 

assessment and plan of care. 








- Patient Problems


(1) Acute HFrEF (heart failure with reduced ejection fraction)


Current Visit: Yes   Status: Acute   





(2) Cardiomyopathy


Current Visit: Yes   Status: Chronic   





(3) Uncontrolled hypertension


Current Visit: Yes   Status: Acute   





Subjective


Date of service: 12/11/20


Principal diagnosis: HF


Interval history: 


pt for stress test today, BLE edema improving. SOB improving. tele reviewed - in

SR HR 80s - 90s.








Objective


                                        


                                Last Vital Signs











Temp  98.1 F   12/11/20 03:42


 


Pulse  88   12/11/20 03:42


 


Resp  16   12/11/20 03:42


 


BP  126/92   12/10/20 21:03


 


Pulse Ox  100   12/11/20 03:42














- Physical Examination


General: No Apparent Distress


HEENT: Positive: PERRL, Normocephaly, Mucus Membranes Moist


Neck: Positive: neck supple, trachea midline


Cardiac: Positive: Reg Rate and Rhythm, S1/S2


Lungs: Positive: Decreased Breath Sounds


Neuro: Positive: Grossly Intact


Abdomen: Negative: Tender


Skin: Negative: Rash


Musculoskeletal: No Pain


Extremities: Present: +2 Edema (BLE)





- Labs and Meds


                          Comprehensive Metabolic Panel











  12/11/20 Range/Units





  04:34 


 


Sodium  139  (137-145)  mmol/L


 


Potassium  4.7  (3.6-5.0)  mmol/L


 


Chloride  101.4  ()  mmol/L


 


Carbon Dioxide  33 H  (22-30)  mmol/L


 


BUN  23 H  (9-20)  mg/dL


 


Creatinine  1.3  (0.8-1.3)  mg/dL


 


Glucose  84  ()  mg/dL


 


Calcium  8.7  (8.4-10.2)  mg/dL














- Imaging and Cardiology


EKG: report reviewed, image reviewed


Echo: report reviewed (EF 25-30%, mod LVH, LA mod dilated, RV systolic function 

mildly reduced, RV mod dilated, RA mod dilated, mod MR, RVSP 48mmHg, trivial 

pericardial effusion, mod pleural effusion.)





- Telemetry


EKG Rhythm: Sinus Rhythm





- EKG


Sinus rhythms and dysrhythmias: sinus rhythm

## 2020-12-11 NOTE — PROGRESS NOTE
Assessment and Plan


Assessment and plan: 


60-year-old -American male with known history of hypertension presenting 

to the emergency room today complaining of shortness of breath, cough and lower 

extremity swelling which has been ongoing for about 2 weeks.  Symptoms were said

to have gotten worse over the past few days and decided to report to the 

emergency room.  Cough has been nonproductive.  He denies any chest pain, no 

fever or chills, no headache or dizziness, no nausea vomiting, no abdominal 

pain, no hematuria or dysuria.  He denies any sick contacts and no recent 

travel, denies any contact with anyone with COVID-19.


Patient has been having orthopnea and has had to prop himself up on pillows at 

home in order to feel comfortable.  He has been wheezing occasionally.


Per patient he recently lost 40 pounds.  Diet and exercise but states that he 

feels this more due to his medical condition.  On admission his chest x-ray 

showed cardiomegaly with mild pulmonary vascular congestion and a proBNP of 2831


Work-up in the emergency room today reveals elevated BNP, chest x-ray showed 

cardiomegaly with pulmonary vascular congestion, EKG shows sinus tachycardia.





12/10: Patient seen and examined, no acute event overnight. TTE reviewed - EF 

25-30%, mod LVH, LA mod dilated, RV systolic function mildly reduced, RV mod 

dilated, RA mod dilated, mod MR, RVSP 48mmHg, trivial pericardial effusion, mod 

pleural effusion. Cardilology input noted with adjustments made to meds. 





12/11: Patient for stress test today, clinically is improving.  Discussed with 

cardiology 1-2 more days of diuretic to better optimize patient prior to dischar

ge.  Stress test was negative. Start on Megace for appetite stimulant.





Acute congestive heart failure presumed systolic


Hypertension


Elevated blood sugar


Nausea and vomiting 


Medical noncompliance


Chronic pain syndrome


Weight loss of 40 pounds





Plan


Continue goal-directed medical therapy


Cardiology input noted


Outpatient GI evaluation for age-appropriate colonoscopy


Continue home medication


Check A1c to ensure no underlying diabetes mellitus


DVT and GI prophylaxis





History


Interval history: 


Patient seen and examined, standing walking around reports significant 

improvement heart stress test today.  Still complains of low appetite.  

Reinforced with the patient the need to follow-up with GI outpatient





Hospitalist Physical





- Physical exam


Narrative exam: 


VITAL SIGNS:  Reviewed.    Exam limited due to the global pandemic and effort to

preserve PPE


GENERAL:  The patient appears normally developed, Vital signs as documented.


HEAD:  No signs of head trauma.


EYES:  Pupils are equal.  Extraocular motions intact.  


EARS:  Hearing grossly intact.


MOUTH:  Oropharynx is normal except for missing dentition. 


NECK:  No adenopathy, no JVD.  


CHEST:  Chest with diminished breath sounds bilaterally.  No wheezes, rales, or 

rhonchi.  


CARDIAC:  Regular rate and rhythm.  S1 and S2, without murmurs, gallops, or 

rubs.


VASCULAR: +1 edema.  Peripheral pulses normal and equal in all extremities.


ABDOMEN:  Soft, non tender and non distended.  No   rebound or guarding, and no 

masses palpated.   Bowel Sounds normal.


MUSCULOSKELETAL:  Good range of motion of all major joints. Extremities without 

clubbing, cyanosis.  +1 pitting edema bilateral lower extremity.  


NEUROLOGIC EXAM:  Alert and oriented x 3   No focal sensory or strength 

deficits.   Speech normal.  Follows commands.


PSYCHIATRIC:  Mood normal.


SKIN:  detail exam as documented in skin assessment








- Constitutional


Vitals: 


                                        











Temp Pulse Resp BP Pulse Ox


 


 98.1 F   88   16   126/92   100 


 


 12/11/20 03:42  12/11/20 03:42  12/11/20 03:42  12/10/20 21:03  12/11/20 03:42











General appearance: Present: no acute distress





HEART Score





- HEART Score


Troponin: 


                                        











Troponin T  0.027 ng/mL (0.00-0.029)   12/08/20  21:03    














Results





- Labs


CBC & Chem 7: 


                                 12/09/20 07:17





                                 12/11/20 04:34


Labs: 


                             Laboratory Last Values











WBC  5.0 K/mm3 (4.5-11.0)   12/09/20  07:17    


 


RBC  4.54 M/mm3 (3.65-5.03)   12/09/20  07:17    


 


Hgb  13.1 gm/dl (11.8-15.2)   12/09/20  07:17    


 


Hct  39.7 % (35.5-45.6)   12/09/20  07:17    


 


MCV  87 fl (84-94)   12/09/20  07:17    


 


MCH  29 pg (28-32)   12/09/20  07:17    


 


MCHC  33 % (32-34)   12/09/20  07:17    


 


RDW  15.4 % (13.2-15.2)  H  12/09/20  07:17    


 


Plt Count  169 K/mm3 (140-440)   12/09/20  07:17    


 


Lymph % (Auto)  18.3 % (13.4-35.0)   12/08/20  19:03    


 


Mono % (Auto)  8.9 % (0.0-7.3)  H  12/08/20  19:03    


 


Eos % (Auto)  1.9 % (0.0-4.3)   12/08/20  19:03    


 


Baso % (Auto)  0.8 % (0.0-1.8)   12/08/20  19:03    


 


Lymph # (Auto)  1.0 K/mm3 (1.2-5.4)  L  12/08/20  19:03    


 


Mono # (Auto)  0.5 K/mm3 (0.0-0.8)   12/08/20  19:03    


 


Eos # (Auto)  0.1 K/mm3 (0.0-0.4)   12/08/20  19:03    


 


Baso # (Auto)  0.0 K/mm3 (0.0-0.1)   12/08/20  19:03    


 


Add Manual Diff  Complete   12/09/20  07:17    


 


Total Counted  100   12/09/20  07:17    


 


Seg Neutrophils %  Np   12/09/20  07:17    


 


Seg Neuts % (Manual)  91.0 % (40.0-70.0)  H  12/09/20  07:17    


 


Band Neutrophils %  0 %  12/09/20  07:17    


 


Lymphocytes % (Manual)  9.0 % (13.4-35.0)  L  12/09/20  07:17    


 


Reactive Lymphs % (Man)  0 %  12/09/20  07:17    


 


Monocytes % (Manual)  0 % (0.0-7.3)   12/09/20  07:17    


 


Eosinophils % (Manual)  0 % (0.0-4.3)   12/09/20  07:17    


 


Basophils % (Manual)  0 % (0.0-1.8)   12/09/20  07:17    


 


Metamyelocytes %  0 %  12/09/20  07:17    


 


Myelocytes %  0 %  12/09/20  07:17    


 


Promyelocytes %  0 %  12/09/20  07:17    


 


Blast Cells %  0 %  12/09/20  07:17    


 


Nucleated RBC %  Not Reportable   12/09/20  07:17    


 


Seg Neutrophils #  4.0 K/mm3 (1.8-7.7)   12/08/20  19:03    


 


Seg Neutrophils # Man  4.6 K/mm3 (1.8-7.7)   12/09/20  07:17    


 


Band Neutrophils #  0.0 K/mm3  12/09/20  07:17    


 


Lymphocytes # (Manual)  0.5 K/mm3 (1.2-5.4)  L  12/09/20  07:17    


 


Abs React Lymphs (Man)  0.0 K/mm3  12/09/20  07:17    


 


Monocytes # (Manual)  0.0 K/mm3 (0.0-0.8)   12/09/20  07:17    


 


Eosinophils # (Manual)  0.0 K/mm3 (0.0-0.4)   12/09/20  07:17    


 


Basophils # (Manual)  0.0 K/mm3 (0.0-0.1)   12/09/20  07:17    


 


Metamyelocytes #  0.0 K/mm3  12/09/20  07:17    


 


Myelocytes #  0.0 K/mm3  12/09/20  07:17    


 


Promyelocytes #  0.0 K/mm3  12/09/20  07:17    


 


Blast Cells #  0.0 K/mm3  12/09/20  07:17    


 


WBC Morphology  Not Reportable   12/09/20  07:17    


 


Hypersegmented Neuts  Not Reportable   12/09/20  07:17    


 


Hyposegmented Neuts  Not Reportable   12/09/20  07:17    


 


Hypogranular Neuts  Not Reportable   12/09/20  07:17    


 


Smudge Cells  Not Reportable   12/09/20  07:17    


 


Toxic Granulation  Not Reportable   12/09/20  07:17    


 


Toxic Vacuolation  Not Reportable   12/09/20  07:17    


 


Dohle Bodies  Not Reportable   12/09/20  07:17    


 


Pelger-Huet Anomaly  Not Reportable   12/09/20  07:17    


 


Vernell Rods  Not Reportable   12/09/20  07:17    


 


Platelet Estimate  Consistent w auto   12/09/20  07:17    


 


Clumped Platelets  Not Reportable   12/09/20  07:17    


 


Plt Clumps, EDTA  Not Reportable   12/09/20  07:17    


 


Large Platelets  Not Reportable   12/09/20  07:17    


 


Giant Platelets  Not Reportable   12/09/20  07:17    


 


Platelet Satelliting  Not Reportable   12/09/20  07:17    


 


Plt Morphology Comment  Not Reportable   12/09/20  07:17    


 


RBC Morphology  Normal   12/09/20  07:17    


 


Dimorphic RBCs  Not Reportable   12/09/20  07:17    


 


Polychromasia  Not Reportable   12/09/20  07:17    


 


Hypochromasia  Not Reportable   12/09/20  07:17    


 


Poikilocytosis  Not Reportable   12/09/20  07:17    


 


Anisocytosis  Not Reportable   12/09/20  07:17    


 


Microcytosis  Not Reportable   12/09/20  07:17    


 


Macrocytosis  Not Reportable   12/09/20  07:17    


 


Spherocytes  Not Reportable   12/09/20  07:17    


 


Pappenheimer Bodies  Not Reportable   12/09/20  07:17    


 


Sickle Cells  Not Reportable   12/09/20  07:17    


 


Target Cells  Not Reportable   12/09/20  07:17    


 


Tear Drop Cells  Not Reportable   12/09/20  07:17    


 


Ovalocytes  Not Reportable   12/09/20  07:17    


 


Helmet Cells  Not Reportable   12/09/20  07:17    


 


Gilbert-Euless Bodies  Not Reportable   12/09/20  07:17    


 


Cabot Rings  Not Reportable   12/09/20  07:17    


 


Beaufort Cells  Not Reportable   12/09/20  07:17    


 


Bite Cells  Not Reportable   12/09/20  07:17    


 


Crenated Cell  Not Reportable   12/09/20  07:17    


 


Elliptocytes  Not Reportable   12/09/20  07:17    


 


Acanthocytes (Spur)  Not Reportable   12/09/20  07:17    


 


Rouleaux  Not Reportable   12/09/20  07:17    


 


Hemoglobin C Crystals  Not Reportable   12/09/20  07:17    


 


Schistocytes  Not Reportable   12/09/20  07:17    


 


Malaria parasites  Not Reportable   12/09/20  07:17    


 


Ruddy Bodies  Not Reportable   12/09/20  07:17    


 


Hem Pathologist Commnt  No   12/09/20  07:17    


 


PT  13.0 Sec. (12.2-14.9)   12/09/20  07:17    


 


INR  0.99  (0.87-1.13)   12/09/20  07:17    


 


Sodium  139 mmol/L (137-145)   12/11/20  04:34    


 


Potassium  4.7 mmol/L (3.6-5.0)   12/11/20  04:34    


 


Chloride  101.4 mmol/L ()   12/11/20  04:34    


 


Carbon Dioxide  33 mmol/L (22-30)  H  12/11/20  04:34    


 


Anion Gap  9 mmol/L  12/11/20  04:34    


 


BUN  23 mg/dL (9-20)  H  12/11/20  04:34    


 


Creatinine  1.3 mg/dL (0.8-1.3)   12/11/20  04:34    


 


Estimated GFR  > 60 ml/min  12/11/20  04:34    


 


BUN/Creatinine Ratio  18 %  12/11/20  04:34    


 


Glucose  84 mg/dL ()   12/11/20  04:34    


 


Hemoglobin A1c  6.2 % (4-6)  H  12/09/20  17:11    


 


Calcium  8.7 mg/dL (8.4-10.2)   12/11/20  04:34    


 


Total Bilirubin  0.30 mg/dL (0.1-1.2)   12/08/20  19:03    


 


AST  41 units/L (5-40)  H  12/08/20  19:03    


 


ALT  49 units/L (7-56)   12/08/20  19:03    


 


Alkaline Phosphatase  93 units/L ()   12/08/20  19:03    


 


Troponin T  0.027 ng/mL (0.00-0.029)   12/08/20  21:03    


 


NT-Pro-B Natriuret Pep  2831 pg/mL (0-900)  H  12/08/20  19:03    


 


Total Protein  5.9 g/dL (6.3-8.2)  L  12/08/20  19:03    


 


Albumin  3.5 g/dL (3.9-5)  L  12/08/20  19:03    


 


Albumin/Globulin Ratio  1.5 %  12/08/20  19:03    


 


Triglycerides  135 mg/dL (2-149)   12/10/20  05:16    


 


Cholesterol  178 mg/dL ()   12/10/20  05:16    


 


LDL Cholesterol Direct  121 mg/dL ()   12/10/20  05:16    


 


HDL Cholesterol  57 mg/dL (40-59)   12/10/20  05:16    


 


Cholesterol/HDL Ratio  3.12 %  12/10/20  05:16    














- Diagnostic Impressions


Diagnostic Impressions: 








Echocardiogram  12/08/20 22:40


Transthoracic Echocardiogram


 


Indication:


SOB 


BP:           145/93


HR:           107


 


Conclusions


 *The left ventricular chamber size is normal.


 *Moderate concentric left ventricular hypertrophy is observed.


 *Severe global hypokinesis of the left ventricle is observed.


 *The estimated ejection fraction is 25-30%. 


 *The left ventricular diastolic filling pattern is consistent with


elevated left ventricular end-diastolic pressure.


 *The left atrium is moderately dilated.


 *The right ventricular global systolic function is mildly reduced.


 *The right ventricle is moderately dilated. 


 *The right atrium is moderately dilated. 


 *There is moderate mitral regurgitation. 


 *The right ventricular systolic pressure is calculated at 48 mmHg. 


 *A trivial pericardial effusion is visualized.


 *There is a moderate pleural effusion. 


 *There is less than 50% respiratory change in the inferior vena cava


dimension.


 


Findings     


Left Ventricle:


The left ventricular chamber size is normal. Moderate concentric left


ventricular hypertrophy is observed. Severe global hypokinesis of the


left ventricle is observed. Global left ventricular systolic function is


severely decreased. The estimated ejection fraction is 25-30%.  The left


ventricular diastolic filling pattern is consistent with elevated left


ventricular end-diastolic pressure. 


 


Left Atrium:


The left atrium is moderately dilated. 


 


Right Ventricle:


The right ventricle is moderately dilated.  The right ventricular global


systolic function is mildly reduced. 


 


Right Atrium:


The right atrium is moderately dilated.  


 


Aortic Valve:


The aortic valve leaflets are mildly thickened. There is no evidence of


aortic regurgitation. 


 


Mitral Valve:


The mitral valve leaflets are mildly thickened. There is moderate mitral


regurgitation.  


 


Tricuspid Valve:


The tricuspid valve leaflets are normal.  There is moderate to severe


tricuspid regurgitation. The right ventricular systolic pressure is


calculated at 48 mmHg.  


 


Pulmonic Valve:


The pulmonic valve appears normal. There is trace pulmonic


regurgitation.  


 


Pericardium:


A trivial pericardial effusion is visualized. There is a moderate


pleural effusion.  


 


Venous:


The inferior vena cava is dilated.  There is less than 50% respiratory


change in the inferior vena cava dimension. 


 


Measurements     


Chambers 2D


Name                    Value         Normal Range            


IVSd (2D)               1.41 cm       (0.6 - 1.1)             


LVPWd (2D)              1.4 cm        (0.6 - 1.1)             


LVIDd (2D)              5.35 cm       (3.7 - 5.6)             


LVIDs (2D)              4.85 cm       (2 - 3.8)               


LV FS (2D)              9.19 %        -                        


EF Teichholz (2D)       20.05 %       -                        


Ao root diameter (2D)   3.01 cm       (2 - 3.7)               


 


Volumes/Mass


Name                    Value         Normal Range            


LA ESV SP 4CH (A/L)     95.41 ml      -                        


LA ESV SP 2CH (A/L)     98.82 ml      -                        


LA ESV BP (A/L)         102.49 ml     -                        


LA ESV BP (A/L) index   48.81 ml/m2   -                        


LA ESV SP 4CH (MOD)     86.52 ml      -                        


LA ESV SP 2CH (MOD)     92.96 ml      -                        


LA ESV BP (MOD)         94.59 ml      -                        


LA ESV BP (MOD) index   45.04 ml/m2   -                        


 


Diastolic/Systolic Function


Name                    Value         Normal Range            


MV E-wave Vmax          0.82 m/sec    -                        


MV deceleration time    177.9 msec    -                        


 


Aortic Valve


Name                    Value         Normal Range            


AV Vmax                 1.02 m/sec    -                        


AV VTI                  15.89 cm      -                        


AV peak gradient        4.18 mmHg     -                        


AV mean gradient        2.45 mmHg     -                        


LVOT diameter           2.09 cm       -                        


LVOT Vmax               0.78 m/sec    -                        


LVOT VTI                13.02 cm      -                        


LVOT peak gradient      2.41 mmHg     -                        


LVOT mean gradient      1.44 mmHg     -                        


SV LVOT                 44.71 ml      -                        


EDGAR (continuity Vmax)   2.61 cm2      -                        


EDGAR (continuity VTI)    2.81 cm2      -                        


 


Mitral Valve


Name                    Value         Normal Range            


MR volume (PISA)        22.29 ml      -                        


MR flow (PISA)          83.41 ml/sec  -                        


MR ERO                  0.15 cm2      -                        


MR PISA radius          0.74 cm       -                        


MR alias Vmax           24.57 cm/sec  -                        


 


Tricuspid Valve


Name                    Value         Normal Range            


TR Vmax                 2.91 m/sec    -                        


TR peak gradient        33 mmHg       -                        


RAP                     15 mmHg       -                        


RVSP                    48 mmHg       -                        


IVC diameter            2.45 cm       (1.2 - 2.3)             


 


Pulmonic Valve/Qp:Qs


Name                    Value         Normal Range            


PV Vmax                 0.53 m/sec    -                        


PV peak gradient        1.14 mmHg     -                        


KY end-diastolic Vmax   1.17 m/sec    -                        


PV acceleration time    68.51 msec    -                        


 


Electronically signed by: Bright Berrios MD on 12/09/2020


16:47:39











[place]/IV: 


                                        





Voiding Method                   Toilet


IV Catheter Type [Left           INT / Saline Lock


Antecubital]                     











Active Medications





- Current Medications


Current Medications: 














Generic Name Dose Route Start Last Admin





  Trade Name Freq  PRN Reason Stop Dose Admin


 


Acetaminophen  650 mg  12/08/20 22:39 





  Tylenol  PO  





  Q4H PRN  





  Pain MILD(1-3)/Fever >100.5/HA  


 


Aspirin  81 mg  12/10/20 10:00  12/10/20 10:02





  Baby Aspirin  PO   81 mg





  QDAY GARRY   Administration


 


Benzonatate  100 mg  12/09/20 14:00  12/11/20 06:23





  Tessalon Perles  PO   100 mg





  Q8HR GARRY   Administration


 


Bumetanide  1 mg  12/10/20 11:30  12/11/20 06:22





  Bumetanide 1 Mg/4 Ml Inj  IV   1 mg





  BID@0600,1800 GARRY   Administration


 


Cyclobenzaprine HCl  10 mg  12/09/20 11:42 





  Flexeril  PO  





  TID PRN  





  Muscle Spasm  


 


Diphenhydramine HCl  25 mg  12/09/20 01:27  12/09/20 01:55





  Benadryl  PO   25 mg





  QHS PRN   Administration





  Sleep  


 


Heparin Sodium (Porcine)  5,000 unit  12/09/20 06:00  12/11/20 06:23





  Heparin  SUB-Q   5,000 unit





  Q8HR GARRY   Administration


 


Losartan Potassium  25 mg  12/10/20 10:00  12/10/20 10:02





  Cozaar  PO   25 mg





  QDAY [facility]   Administration


 


Magnesium Hydroxide  30 ml  12/08/20 22:39 





  Milk Of Magnesia  PO  





  Q4H PRN  





  Constipation  


 


Metoprolol Succinate  50 mg  12/09/20 11:00  12/10/20 10:03





  Metoprolol Xl  PO   50 mg





  QDAY GARRY   Administration


 


Ondansetron HCl  4 mg  12/08/20 22:39  12/10/20 21:42





  Zofran  IV   4 mg





  Q8H PRN   Administration





  Nausea And Vomiting  


 


Oxycodone HCl  10 mg  12/09/20 12:37  12/10/20 20:16





  Roxicodone  PO   10 mg





  Q6H PRN   Administration





  Pain, Moderate (4-6)  


 


Oxycodone/Acetaminophen  1 tab  12/09/20 11:49  12/10/20 20:15





  Percocet 5/325  PO   1 tab





  Q6H PRN   Administration





  Pain, Moderate (4-6)  


 


Sodium Chloride  10 ml  12/09/20 10:00  12/10/20 21:43





  Sodium Chloride Flush Syringe 10 Ml  IV   10 ml





  BID GARRY   Administration


 


Sodium Chloride  10 ml  12/08/20 22:39 





  Sodium Chloride Flush Syringe 10 Ml  IV  





  PRN PRN  





  LINE FLUSH  














Nutrition/Malnutrition Assess





- Dietary Evaluation


Nutrition/Malnutrition Findings: 


                                        





Nutrition Notes                                            Start:  12/09/20 

12:58


Freq:                                                      Status: Active       




Protocol:                                                                       




 Document     12/10/20 14:23  CW  (Rec: 12/10/20 14:45  CW  [id number])


 Co-Sign      12/10/20 14:23  MK


 Nutrition Notes


     Need for Assessment generated from:         MD Order,Education


     Initial or Follow up                        Assessment


     Current Diagnosis                           Hypertension,Heart Failure


     Other Pertinent Diagnosis                   Edema, fluid restriction


     Current Diet                                Cardiac


     Labs/Tests                                  


     Pertinent Medications                       12/10 Zofran


     Height                                      5 ft 11 in


     Weight                                      86.9 kg


     Usual Body Weight                           97.5 kg


     Ideal Body Weight (kg)                      78.18


     BMI                                         26.7


     Intake Prior to Admission                   Fair


     Weight change and time frame                11% wt loss in 3-4 months


     Weight Status                               Overweight


     Subjective/Other Information                MD consult for diet education.


                                                 Pt states prior poor intake


                                                 of only soups, juices, ONS. Pt


                                                 reports UBW of 215lb and


                                                 unintentional wt loss over the


                                                 past 3-4 months. Pt reports


                                                 difficulty swallowing solids.


                                                 Pt request Ensure and


                                                 preferences recorded. Pt was


                                                 educated on fluid restriction,


                                                 low sodium, heart health diet


                                                 . DI talked to RN about SLP


                                                 consult.


     Burn                                        Absent


     Trauma                                      Absent


     GI Symptoms                                 None


     Difficulty In                               Swallowing


     Current % PO                                Fair (50-74%)


     Minimum of two criteria                     No physical signs of


                                                 malnutrition


     Interpretation of Weight Loss (severe)      >7.5% in 3 months


     #1


      Nutrition Diagnosis                        Inadequate oral intake


      Etiology                                   swallowing difficulties


      As Evidenced by Signs and Symptoms         pt reports eating 0% of lunch


                                                 and <25% of breakfast


     Is patient on ventilator?                   No


     Is Patient Ambulatory and/or Out of Bed     Yes


     REE-([facility]-ambulatory/OOB) [     2204.969


      [email or web address]]                               


     Kcal/Kg value to use for calculation        24


     Approximate Energy Requirements Using       2086


      kcal/Kg                                    


     Calculation Used for Recommendations        Kcal/kg


     Additional Notes                            Protein needs: 0.8-1g/kg (68-


                                                 86g/day)


                                                 Fluid: 1500ml/day or per MD


 Nutrition Intervention


     Change Diet Order:                          cardiac mechanical soft


     Add Supplement/Snack (indicate name/kcal    Ensure Enlive BID


      /protein )                                 


     Provides kCal:                              700


     Provides Protein (gm)                       40


     Teaching Recipient                          Patient


     Learning Readiness                          Good


     Teaching Methods                            Discussion,Handout


     Response to Teaching                        Verbalize understanding


     Education Handouts Provided                 AND Low Sodium


                                                 AND Heart Healthy


                                                 AND Label Reading


     Barriers to Learning                        Financial,Social


     RD phone number provided                    Yes


     Patient aware of follow up options          Yes


     Actions To Overcome Barriers                Other


     Goal #1                                     Meet at least 75% pt protein


                                                 and energy needs via PO/ONS


     Anticipated Discharge Needs:                Unable to determine


     Follow-Up By:                               12/14/20


     Additional Comments                         FU PO/ONS intakes, SLP consult

## 2020-12-12 RX ADMIN — OXYCODONE AND ACETAMINOPHEN PRN TAB: 5; 325 TABLET ORAL at 21:11

## 2020-12-12 RX ADMIN — Medication SCH ML: at 09:30

## 2020-12-12 RX ADMIN — LOSARTAN POTASSIUM SCH MG: 25 TABLET, FILM COATED ORAL at 09:29

## 2020-12-12 RX ADMIN — Medication SCH ML: at 21:12

## 2020-12-12 RX ADMIN — ASPIRIN SCH MG: 81 TABLET, CHEWABLE ORAL at 09:29

## 2020-12-12 RX ADMIN — HEPARIN SODIUM SCH UNIT: 5000 INJECTION, SOLUTION INTRAVENOUS; SUBCUTANEOUS at 05:37

## 2020-12-12 RX ADMIN — BENZONATATE SCH MG: 100 CAPSULE ORAL at 13:25

## 2020-12-12 RX ADMIN — MEGESTROL ACETATE SCH MG: 40 SUSPENSION ORAL at 09:29

## 2020-12-12 RX ADMIN — BUMETANIDE SCH MG: 0.25 INJECTION, SOLUTION INTRAMUSCULAR; INTRAVENOUS at 17:10

## 2020-12-12 RX ADMIN — HEPARIN SODIUM SCH UNIT: 5000 INJECTION, SOLUTION INTRAVENOUS; SUBCUTANEOUS at 13:24

## 2020-12-12 RX ADMIN — ONDANSETRON PRN MG: 2 INJECTION INTRAMUSCULAR; INTRAVENOUS at 13:27

## 2020-12-12 RX ADMIN — HEPARIN SODIUM SCH UNIT: 5000 INJECTION, SOLUTION INTRAVENOUS; SUBCUTANEOUS at 21:12

## 2020-12-12 RX ADMIN — OXYCODONE AND ACETAMINOPHEN PRN TAB: 5; 325 TABLET ORAL at 05:36

## 2020-12-12 RX ADMIN — BENZONATATE SCH MG: 100 CAPSULE ORAL at 21:11

## 2020-12-12 RX ADMIN — BENZONATATE SCH MG: 100 CAPSULE ORAL at 05:36

## 2020-12-12 RX ADMIN — METOPROLOL SUCCINATE SCH MG: 50 TABLET, EXTENDED RELEASE ORAL at 09:29

## 2020-12-12 RX ADMIN — BUMETANIDE SCH MG: 0.25 INJECTION, SOLUTION INTRAMUSCULAR; INTRAVENOUS at 05:37

## 2020-12-12 NOTE — PROGRESS NOTE
Assessment and Plan


Assessment and plan: 


60-year-old -American male with known history of hypertension presenting 

to the emergency room today complaining of shortness of breath, cough and lower 

extremity swelling which has been ongoing for about 2 weeks.  Symptoms were said

to have gotten worse over the past few days and decided to report to the 

emergency room.  Cough has been nonproductive.  He denies any chest pain, no 

fever or chills, no headache or dizziness, no nausea vomiting, no abdominal 

pain, no hematuria or dysuria.  He denies any sick contacts and no recent 

travel, denies any contact with anyone with COVID-19.


Patient has been having orthopnea and has had to prop himself up on pillows at 

home in order to feel comfortable.  He has been wheezing occasionally.


Per patient he recently lost 40 pounds.  Diet and exercise but states that he 

feels this more due to his medical condition.  On admission his chest x-ray 

showed cardiomegaly with mild pulmonary vascular congestion and a proBNP of 2831


Work-up in the emergency room today reveals elevated BNP, chest x-ray showed 

cardiomegaly with pulmonary vascular congestion, EKG shows sinus tachycardia.





12/10: Patient seen and examined, no acute event overnight. TTE reviewed - EF 

25-30%, mod LVH, LA mod dilated, RV systolic function mildly reduced, RV mod 

dilated, RA mod dilated, mod MR, RVSP 48mmHg, trivial pericardial effusion, mod 

pleural effusion. Cardilology input noted with adjustments made to meds. 





12/11: Patient for stress test today, clinically is improving.  Discussed with 

cardiology 1-2 more days of diuretic to better optimize patient prior to dischar

ge.  Stress test was negative. Start on Megace for appetite stimulant.





12/12: Continue Diuresis, patient now states that he is homeless, case 

management consulted. He also reports depression but no SI/HI, he states his PCP

is aware and he will discuss more with him. I recommended Psych consult but he 

declined. 





Acute congestive heart failure presumed systolic


Hypertension


Elevated blood sugar


Nausea and vomiting 


Medical noncompliance


Chronic pain syndrome


Weight loss of 40 pounds





Plan


Continue goal-directed medical therapy


Cardiology input noted


Outpatient GI evaluation for age-appropriate colonoscopy


Continue home medication


Check A1c to ensure no underlying diabetes mellitus


DVT and GI prophylaxis





History


Interval history: 


Patient seen and examined, Stress test was negative, patient continues diuresis.







Hospitalist Physical





- Physical exam


Narrative exam: 


VITAL SIGNS:  Reviewed.    Exam limited due to the global pandemic and effort to

preserve PPE


GENERAL:  The patient appears normally developed, Vital signs as documented.


HEAD:  No signs of head trauma.


EYES:  Pupils are equal.  Extraocular motions intact.  


EARS:  Hearing grossly intact.


MOUTH:  Oropharynx is normal except for missing dentition. 


NECK:  No adenopathy, no JVD.  


CHEST:  Chest with diminished breath sounds bilaterally.  No wheezes, rales, or 

rhonchi.  


CARDIAC:  Regular rate and rhythm.  S1 and S2, without murmurs, gallops, or 

rubs.


VASCULAR: +1 edema.  Peripheral pulses normal and equal in all extremities.


ABDOMEN:  Soft, non tender and non distended.  No   rebound or guarding, and no 

masses palpated.   Bowel Sounds normal.


MUSCULOSKELETAL:  Good range of motion of all major joints. Extremities without 

clubbing, cyanosis.  +1 pitting edema bilateral lower extremity.  


NEUROLOGIC EXAM:  Alert and oriented x 3   No focal sensory or strength 

deficits.   Speech normal.  Follows commands.


PSYCHIATRIC:  Mood normal.


SKIN:  detail exam as documented in skin assessment








- Constitutional


Vitals: 


                                        











Temp Pulse Resp BP Pulse Ox


 


 97.8 F   86   18   138/89   97 


 


 12/12/20 04:17  12/12/20 04:17  12/12/20 06:36  12/12/20 04:17  12/12/20 04:17











General appearance: Present: no acute distress





HEART Score





- HEART Score


Troponin: 


                                        











Troponin T  0.027 ng/mL (0.00-0.029)   12/08/20  21:03    














Results





- Labs


CBC & Chem 7: 


                                 12/09/20 07:17





                                 12/11/20 04:34


Labs: 


                             Laboratory Last Values











WBC  5.0 K/mm3 (4.5-11.0)   12/09/20  07:17    


 


RBC  4.54 M/mm3 (3.65-5.03)   12/09/20  07:17    


 


Hgb  13.1 gm/dl (11.8-15.2)   12/09/20  07:17    


 


Hct  39.7 % (35.5-45.6)   12/09/20  07:17    


 


MCV  87 fl (84-94)   12/09/20  07:17    


 


MCH  29 pg (28-32)   12/09/20  07:17    


 


MCHC  33 % (32-34)   12/09/20  07:17    


 


RDW  15.4 % (13.2-15.2)  H  12/09/20  07:17    


 


Plt Count  169 K/mm3 (140-440)   12/09/20  07:17    


 


Lymph % (Auto)  18.3 % (13.4-35.0)   12/08/20  19:03    


 


Mono % (Auto)  8.9 % (0.0-7.3)  H  12/08/20  19:03    


 


Eos % (Auto)  1.9 % (0.0-4.3)   12/08/20  19:03    


 


Baso % (Auto)  0.8 % (0.0-1.8)   12/08/20  19:03    


 


Lymph # (Auto)  1.0 K/mm3 (1.2-5.4)  L  12/08/20  19:03    


 


Mono # (Auto)  0.5 K/mm3 (0.0-0.8)   12/08/20  19:03    


 


Eos # (Auto)  0.1 K/mm3 (0.0-0.4)   12/08/20  19:03    


 


Baso # (Auto)  0.0 K/mm3 (0.0-0.1)   12/08/20  19:03    


 


Add Manual Diff  Complete   12/09/20  07:17    


 


Total Counted  100   12/09/20  07:17    


 


Seg Neutrophils %  Np   12/09/20  07:17    


 


Seg Neuts % (Manual)  91.0 % (40.0-70.0)  H  12/09/20  07:17    


 


Band Neutrophils %  0 %  12/09/20  07:17    


 


Lymphocytes % (Manual)  9.0 % (13.4-35.0)  L  12/09/20  07:17    


 


Reactive Lymphs % (Man)  0 %  12/09/20  07:17    


 


Monocytes % (Manual)  0 % (0.0-7.3)   12/09/20  07:17    


 


Eosinophils % (Manual)  0 % (0.0-4.3)   12/09/20  07:17    


 


Basophils % (Manual)  0 % (0.0-1.8)   12/09/20  07:17    


 


Metamyelocytes %  0 %  12/09/20  07:17    


 


Myelocytes %  0 %  12/09/20  07:17    


 


Promyelocytes %  0 %  12/09/20  07:17    


 


Blast Cells %  0 %  12/09/20  07:17    


 


Nucleated RBC %  Not Reportable   12/09/20  07:17    


 


Seg Neutrophils #  4.0 K/mm3 (1.8-7.7)   12/08/20  19:03    


 


Seg Neutrophils # Man  4.6 K/mm3 (1.8-7.7)   12/09/20  07:17    


 


Band Neutrophils #  0.0 K/mm3  12/09/20  07:17    


 


Lymphocytes # (Manual)  0.5 K/mm3 (1.2-5.4)  L  12/09/20  07:17    


 


Abs React Lymphs (Man)  0.0 K/mm3  12/09/20  07:17    


 


Monocytes # (Manual)  0.0 K/mm3 (0.0-0.8)   12/09/20  07:17    


 


Eosinophils # (Manual)  0.0 K/mm3 (0.0-0.4)   12/09/20  07:17    


 


Basophils # (Manual)  0.0 K/mm3 (0.0-0.1)   12/09/20  07:17    


 


Metamyelocytes #  0.0 K/mm3  12/09/20  07:17    


 


Myelocytes #  0.0 K/mm3  12/09/20  07:17    


 


Promyelocytes #  0.0 K/mm3  12/09/20  07:17    


 


Blast Cells #  0.0 K/mm3  12/09/20  07:17    


 


WBC Morphology  Not Reportable   12/09/20  07:17    


 


Hypersegmented Neuts  Not Reportable   12/09/20  07:17    


 


Hyposegmented Neuts  Not Reportable   12/09/20  07:17    


 


Hypogranular Neuts  Not Reportable   12/09/20  07:17    


 


Smudge Cells  Not Reportable   12/09/20  07:17    


 


Toxic Granulation  Not Reportable   12/09/20  07:17    


 


Toxic Vacuolation  Not Reportable   12/09/20  07:17    


 


Dohle Bodies  Not Reportable   12/09/20  07:17    


 


Pelger-Huet Anomaly  Not Reportable   12/09/20  07:17    


 


Vernell Rods  Not Reportable   12/09/20  07:17    


 


Platelet Estimate  Consistent w auto   12/09/20  07:17    


 


Clumped Platelets  Not Reportable   12/09/20  07:17    


 


Plt Clumps, EDTA  Not Reportable   12/09/20  07:17    


 


Large Platelets  Not Reportable   12/09/20  07:17    


 


Giant Platelets  Not Reportable   12/09/20  07:17    


 


Platelet Satelliting  Not Reportable   12/09/20  07:17    


 


Plt Morphology Comment  Not Reportable   12/09/20  07:17    


 


RBC Morphology  Normal   12/09/20  07:17    


 


Dimorphic RBCs  Not Reportable   12/09/20  07:17    


 


Polychromasia  Not Reportable   12/09/20  07:17    


 


Hypochromasia  Not Reportable   12/09/20  07:17    


 


Poikilocytosis  Not Reportable   12/09/20  07:17    


 


Anisocytosis  Not Reportable   12/09/20  07:17    


 


Microcytosis  Not Reportable   12/09/20  07:17    


 


Macrocytosis  Not Reportable   12/09/20  07:17    


 


Spherocytes  Not Reportable   12/09/20  07:17    


 


Pappenheimer Bodies  Not Reportable   12/09/20  07:17    


 


Sickle Cells  Not Reportable   12/09/20  07:17    


 


Target Cells  Not Reportable   12/09/20  07:17    


 


Tear Drop Cells  Not Reportable   12/09/20  07:17    


 


Ovalocytes  Not Reportable   12/09/20  07:17    


 


Helmet Cells  Not Reportable   12/09/20  07:17    


 


[place] Bodies  Not Reportable   12/09/20  07:17    


 


Cabot Rings  Not Reportable   12/09/20  07:17    


 


Connor Cells  Not Reportable   12/09/20  07:17    


 


Bite Cells  Not Reportable   12/09/20  07:17    


 


Crenated Cell  Not Reportable   12/09/20  07:17    


 


Elliptocytes  Not Reportable   12/09/20  07:17    


 


Acanthocytes (Spur)  Not Reportable   12/09/20  07:17    


 


Rouleaux  Not Reportable   12/09/20  07:17    


 


Hemoglobin C Crystals  Not Reportable   12/09/20  07:17    


 


Schistocytes  Not Reportable   12/09/20  07:17    


 


Malaria parasites  Not Reportable   12/09/20  07:17    


 


Ruddy Bodies  Not Reportable   12/09/20  07:17    


 


Hem Pathologist Commnt  No   12/09/20  07:17    


 


PT  13.0 Sec. (12.2-14.9)   12/09/20  07:17    


 


INR  0.99  (0.87-1.13)   12/09/20  07:17    


 


Sodium  139 mmol/L (137-145)   12/11/20  04:34    


 


Potassium  4.7 mmol/L (3.6-5.0)   12/11/20  04:34    


 


Chloride  101.4 mmol/L ()   12/11/20  04:34    


 


Carbon Dioxide  33 mmol/L (22-30)  H  12/11/20  04:34    


 


Anion Gap  9 mmol/L  12/11/20  04:34    


 


BUN  23 mg/dL (9-20)  H  12/11/20  04:34    


 


Creatinine  1.3 mg/dL (0.8-1.3)   12/11/20  04:34    


 


Estimated GFR  > 60 ml/min  12/11/20  04:34    


 


BUN/Creatinine Ratio  18 %  12/11/20  04:34    


 


Glucose  84 mg/dL ()   12/11/20  04:34    


 


Hemoglobin A1c  6.2 % (4-6)  H  12/09/20  17:11    


 


Calcium  8.7 mg/dL (8.4-10.2)   12/11/20  04:34    


 


Total Bilirubin  0.30 mg/dL (0.1-1.2)   12/08/20  19:03    


 


AST  41 units/L (5-40)  H  12/08/20  19:03    


 


ALT  49 units/L (7-56)   12/08/20  19:03    


 


Alkaline Phosphatase  93 units/L ()   12/08/20  19:03    


 


Troponin T  0.027 ng/mL (0.00-0.029)   12/08/20  21:03    


 


NT-Pro-B Natriuret Pep  2831 pg/mL (0-900)  H  12/08/20  19:03    


 


Total Protein  5.9 g/dL (6.3-8.2)  L  12/08/20  19:03    


 


Albumin  3.5 g/dL (3.9-5)  L  12/08/20  19:03    


 


Albumin/Globulin Ratio  1.5 %  12/08/20  19:03    


 


Triglycerides  135 mg/dL (2-149)   12/10/20  05:16    


 


Cholesterol  178 mg/dL ()   12/10/20  05:16    


 


LDL Cholesterol Direct  121 mg/dL ()   12/10/20  05:16    


 


HDL Cholesterol  57 mg/dL (40-59)   12/10/20  05:16    


 


Cholesterol/HDL Ratio  3.12 %  12/10/20  05:16    














- Diagnostic Impressions


Diagnostic Impressions: 








Echocardiogram  12/08/20 22:40


Transthoracic Echocardiogram


 


Indication:


SOB 


BP:           145/93


HR:           107


 


Conclusions


 *The left ventricular chamber size is normal.


 *Moderate concentric left ventricular hypertrophy is observed.


 *Severe global hypokinesis of the left ventricle is observed.


 *The estimated ejection fraction is 25-30%. 


 *The left ventricular diastolic filling pattern is consistent with


elevated left ventricular end-diastolic pressure.


 *The left atrium is moderately dilated.


 *The right ventricular global systolic function is mildly reduced.


 *The right ventricle is moderately dilated. 


 *The right atrium is moderately dilated. 


 *There is moderate mitral regurgitation. 


 *The right ventricular systolic pressure is calculated at 48 mmHg. 


 *A trivial pericardial effusion is visualized.


 *There is a moderate pleural effusion. 


 *There is less than 50% respiratory change in the inferior vena cava


dimension.


 


Findings     


Left Ventricle:


The left ventricular chamber size is normal. Moderate concentric left


ventricular hypertrophy is observed. Severe global hypokinesis of the


left ventricle is observed. Global left ventricular systolic function is


severely decreased. The estimated ejection fraction is 25-30%.  The left


ventricular diastolic filling pattern is consistent with elevated left


ventricular end-diastolic pressure. 


 


Left Atrium:


The left atrium is moderately dilated. 


 


Right Ventricle:


The right ventricle is moderately dilated.  The right ventricular global


systolic function is mildly reduced. 


 


Right Atrium:


The right atrium is moderately dilated.  


 


Aortic Valve:


The aortic valve leaflets are mildly thickened. There is no evidence of


aortic regurgitation. 


 


Mitral Valve:


The mitral valve leaflets are mildly thickened. There is moderate mitral


regurgitation.  


 


Tricuspid Valve:


The tricuspid valve leaflets are normal.  There is moderate to severe


tricuspid regurgitation. The right ventricular systolic pressure is


calculated at 48 mmHg.  


 


Pulmonic Valve:


The pulmonic valve appears normal. There is trace pulmonic


regurgitation.  


 


Pericardium:


A trivial pericardial effusion is visualized. There is a moderate


pleural effusion.  


 


Venous:


The inferior vena cava is dilated.  There is less than 50% respiratory


change in the inferior vena cava dimension. 


 


Measurements     


Chambers 2D


Name                    Value         Normal Range            


IVSd (2D)               1.41 cm       (0.6 - 1.1)             


LVPWd (2D)              1.4 cm        (0.6 - 1.1)             


LVIDd (2D)              5.35 cm       (3.7 - 5.6)             


LVIDs (2D)              4.85 cm       (2 - 3.8)               


LV FS (2D)              9.19 %        -                        


EF Teichholz (2D)       20.05 %       -                        


Ao root diameter (2D)   3.01 cm       (2 - 3.7)               


 


Volumes/Mass


Name                    Value         Normal Range            


LA ESV SP 4CH (A/L)     95.41 ml      -                        


LA ESV SP 2CH (A/L)     98.82 ml      -                        


LA ESV BP (A/L)         102.49 ml     -                        


LA ESV BP (A/L) index   48.81 ml/m2   -                        


LA ESV SP 4CH (MOD)     86.52 ml      -                        


LA ESV SP 2CH (MOD)     92.96 ml      -                        


LA ESV BP (MOD)         94.59 ml      -                        


LA ESV BP (MOD) index   45.04 ml/m2   -                        


 


Diastolic/Systolic Function


Name                    Value         Normal Range            


MV E-wave Vmax          0.82 m/sec    -                        


MV deceleration time    177.9 msec    -                        


 


Aortic Valve


Name                    Value         Normal Range            


AV Vmax                 1.02 m/sec    -                        


AV VTI                  15.89 cm      -                        


AV peak gradient        4.18 mmHg     -                        


AV mean gradient        2.45 mmHg     -                        


LVOT diameter           2.09 cm       -                        


LVOT Vmax               0.78 m/sec    -                        


LVOT VTI                13.02 cm      -                        


LVOT peak gradient      2.41 mmHg     -                        


LVOT mean gradient      1.44 mmHg     -                        


SV LVOT                 44.71 ml      -                        


EDGAR (continuity Vmax)   2.61 cm2      -                        


EDGAR (continuity VTI)    2.81 cm2      -                        


 


Mitral Valve


Name                    Value         Normal Range            


MR volume (PISA)        22.29 ml      -                        


MR flow (PISA)          83.41 ml/sec  -                        


MR ERO                  0.15 cm2      -                        


MR PISA radius          0.74 cm       -                        


MR alias Vmax           24.57 cm/sec  -                        


 


Tricuspid Valve


Name                    Value         Normal Range            


TR Vmax                 2.91 m/sec    -                        


TR peak gradient        33 mmHg       -                        


RAP                     15 mmHg       -                        


RVSP                    48 mmHg       -                        


IVC diameter            2.45 cm       (1.2 - 2.3)             


 


Pulmonic Valve/Qp:Qs


Name                    Value         Normal Range            


PV Vmax                 0.53 m/sec    -                        


PV peak gradient        1.14 mmHg     -                        


SC end-diastolic Vmax   1.17 m/sec    -                        


PV acceleration time    68.51 msec    -                        


 


Electronically signed by: Bright Berrios MD on 12/09/2020


16:47:39











[place]/IV: 


                                        





Voiding Method                   Toilet


IV Catheter Type [Left           INT / Saline Lock


Antecubital]                     











Active Medications





- Current Medications


Current Medications: 














Generic Name Dose Route Start Last Admin





  Trade Name Freq  PRN Reason Stop Dose Admin


 


Acetaminophen  650 mg  12/08/20 22:39 





  Tylenol  PO  





  Q4H PRN  





  Pain MILD(1-3)/Fever >100.5/HA  


 


Aspirin  81 mg  12/10/20 10:00  12/12/20 09:29





  Baby Aspirin  PO   81 mg





  QDAY GARRY   Administration


 


Benzonatate  100 mg  12/09/20 14:00  12/12/20 05:36





  Tessalon Perles  PO   100 mg





  Q8HR GARRY   Administration


 


Bumetanide  1 mg  12/10/20 11:30  12/12/20 05:37





  Bumetanide 1 Mg/4 Ml Inj  IV   1 mg





  BID@0600,1800 GARRY   Administration


 


Cyclobenzaprine HCl  10 mg  12/09/20 11:42 





  Flexeril  PO  





  TID PRN  





  Muscle Spasm  


 


Diphenhydramine HCl  25 mg  12/09/20 01:27  12/11/20 21:00





  Benadryl  PO   25 mg





  QHS PRN   Administration





  Sleep  


 


Heparin Sodium (Porcine)  5,000 unit  12/09/20 06:00  12/12/20 05:37





  Heparin  SUB-Q   5,000 unit





  Q8HR GARRY   Administration


 


Losartan Potassium  25 mg  12/10/20 10:00  12/12/20 09:29





  Cozaar  PO   25 mg





  QDAY GARRY   Administration


 


Magnesium Hydroxide  30 ml  12/08/20 22:39 





  Milk Of Magnesia  PO  





  Q4H PRN  





  Constipation  


 


Megestrol Acetate  400 mg  12/11/20 13:00  12/12/20 09:29





  Megestrol 400 Mg/10 Ml Oral Liqd  PO   400 mg





  QDAY GARRY   Administration


 


Metoprolol Succinate  50 mg  12/09/20 11:00  12/12/20 09:29





  Metoprolol Xl  PO   50 mg





  QDAY GARRY   Administration


 


Ondansetron HCl  4 mg  12/08/20 22:39  12/11/20 21:09





  Zofran  IV   4 mg





  Q8H PRN   Administration





  Nausea And Vomiting  


 


Oxycodone HCl  10 mg  12/09/20 12:37  12/12/20 05:35





  Roxicodone  PO   10 mg





  Q6H PRN   Administration





  Pain, Moderate (4-6)  


 


Oxycodone/Acetaminophen  1 tab  12/09/20 11:49  12/12/20 05:36





  Percocet 5/325  PO   1 tab





  Q6H PRN   Administration





  Pain, Moderate (4-6)  


 


Sodium Chloride  10 ml  12/09/20 10:00  12/12/20 09:30





  Sodium Chloride Flush Syringe 10 Ml  IV   10 ml





  BID GARRY   Administration


 


Sodium Chloride  10 ml  12/08/20 22:39  12/12/20 05:38





  Sodium Chloride Flush Syringe 10 Ml  IV   10 ml





  PRN PRN   Administration





  LINE FLUSH  














Nutrition/Malnutrition Assess





- Dietary Evaluation


Nutrition/Malnutrition Findings: 


                                        





Nutrition Notes                                            Start:  12/09/20 

12:58


Freq:                                                      Status: Active       




Protocol:                                                                       




 Document     12/10/20 14:23  CW  (Rec: 12/10/20 14:45  CW  [id number])


 Co-Sign      12/10/20 14:23  MK


 Nutrition Notes


     Need for Assessment generated from:         MD Order,Education


     Initial or Follow up                        Assessment


     Current Diagnosis                           Hypertension,Heart Failure


     Other Pertinent Diagnosis                   Edema, fluid restriction


     Current Diet                                Cardiac


     Labs/Tests                                  


     Pertinent Medications                       12/10 Zofran


     Height                                      5 ft 11 in


     Weight                                      86.9 kg


     Usual Body Weight                           97.5 kg


     Ideal Body Weight (kg)                      78.18


     BMI                                         26.7


     Intake Prior to Admission                   Fair


     Weight change and time frame                11% wt loss in 3-4 months


     Weight Status                               Overweight


     Subjective/Other Information                MD consult for diet education.


                                                 Pt states prior poor intake


                                                 of only soups, juices, ONS. Pt


                                                 reports UBW of 215lb and


                                                 unintentional wt loss over the


                                                 past 3-4 months. Pt reports


                                                 difficulty swallowing solids.


                                                 Pt request Ensure and


                                                 preferences recorded. Pt was


                                                 educated on fluid restriction,


                                                 low sodium, heart health diet


                                                 . DI talked to RN about SLP


                                                 consult.


     Burn                                        Absent


     Trauma                                      Absent


     GI Symptoms                                 None


     Difficulty In                               Swallowing


     Current % PO                                Fair (50-74%)


     Minimum of two criteria                     No physical signs of


                                                 malnutrition


     Interpretation of Weight Loss (severe)      >7.5% in 3 months


     #1


      Nutrition Diagnosis                        Inadequate oral intake


      Etiology                                   swallowing difficulties


      As Evidenced by Signs and Symptoms         pt reports eating 0% of lunch


                                                 and <25% of breakfast


     Is patient on ventilator?                   No


     Is Patient Ambulatory and/or Out of Bed     Yes


     REE-([place]-St. Jeor-ambulatory/OOB) [     9744.969


      [email or web address]]                               


     Kcal/Kg value to use for calculation        24


     Approximate Energy Requirements Using       2086


      kcal/Kg                                    


     Calculation Used for Recommendations        Kcal/kg


     Additional Notes                            Protein needs: 0.8-1g/kg (68-


                                                 86g/day)


                                                 Fluid: 1500ml/day or per MD


 Nutrition Intervention


     Change Diet Order:                          cardiac mechanical soft


     Add Supplement/Snack (indicate name/kcal    Ensure Enlive BID


      /protein )                                 


     Provides kCal:                              700


     Provides Protein (gm)                       40


     Teaching Recipient                          Patient


     Learning Readiness                          Good


     Teaching Methods                            Discussion,Handout


     Response to Teaching                        Verbalize understanding


     Education Handouts Provided                 AND Low Sodium


                                                 AND Heart Healthy


                                                 AND Label Reading


     Barriers to Learning                        Financial,Social


     RD phone number provided                    Yes


     Patient aware of follow up options          Yes


     Actions To Overcome Barriers                Other


     Goal #1                                     Meet at least 75% pt protein


                                                 and energy needs via PO/ONS


     Anticipated Discharge Needs:                Unable to determine


     Follow-Up By:                               12/14/20


     Additional Comments                         FU PO/ONS intakes, SLP consult

## 2020-12-12 NOTE — PROGRESS NOTE
Assessment and Plan





clinically improved and nearing euvolemia


cont iv bumex


cont arb/bb


likely am dc


long d/w regarding importance of medication compliance and lifestyle changes








- Patient Problems


(1) Acute HFrEF (heart failure with reduced ejection fraction)


Current Visit: Yes   Status: Acute   





(2) Hypertension


Current Visit: Yes   Status: Acute   





(3) SOB (shortness of breath) on exertion


Current Visit: Yes   Status: Acute   





(4) Cardiomyopathy


Current Visit: Yes   Status: Chronic   





Subjective


Date of service: 12/12/20


Principal diagnosis: HF


Interval history: 





still mild sob





Objective


                                   Vital Signs











  Temp Pulse Pulse Resp Resp BP Pulse Ox


 


 12/12/20 06:36     18   


 


 12/12/20 06:35     18   


 


 12/12/20 05:36     18   


 


 12/12/20 05:35     18   


 


 12/12/20 04:17  97.8 F  86   20   138/89  97


 


 12/11/20 23:00    92 H  20    100


 


 12/11/20 22:13  97.9 F  92 H   20   151/95  100


 


 12/11/20 22:00      20  


 


 12/11/20 21:00     20   


 


 12/11/20 16:56  98.7 F  95 H   18   126/89  97


 


 12/11/20 12:10  98.7 F  94 H   17   138/93  93














- Physical Examination


General: No Apparent Distress


HEENT: Positive: PERRL, Normocephaly, Mucus Membranes Moist


Neck: Positive: neck supple, trachea midline


Neuro: Positive: Grossly Intact


Abdomen: Negative: Tender


Skin: Negative: Rash


Musculoskeletal: No Pain


Extremities: Present: +2 Edema (BLE)





- Imaging and Cardiology


EKG: report reviewed, image reviewed


Echo: report reviewed (EF 25-30%, mod LVH, LA mod dilated, RV systolic function 

mildly reduced, RV mod dilated, RA mod dilated, mod MR, RVSP 48mmHg, trivial 

pericardial effusion, mod pleural effusion.)





- EKG


Sinus rhythms and dysrhythmias: sinus rhythm

## 2020-12-12 NOTE — TREADMILL REPORT
PHARMACOLOGICAL MYOCARDIAL PERFUSION IMAGING REPORT



The patient is a 60-year-old -American gentleman presented with new onset

congestive heart failure and noted to have cardiomyopathy on the echocardiogram.

 Also, he has underlying uncontrolled hypertension.  A pharmacological stress

test being performed to rule out any significant underlying ischemia.



The patient's baseline EKG showed sinus rhythm with nonspecific diffuse T-wave

changes in all leads.  The patient had resting perfusion images performed with

technetium 99m Myoview agent.  This is followed by vasodilation with IV

regadenoson injection 0.4 mg.  The patient tolerated IV regadenoson well without

any significant side effects.  No chest pain.  EKG did not show any changes from

baseline rhythm and no significant ST-T changes were noted from the baseline. 

The patient had post-vasodilation perfusion images along with gating.



Perfusion images during post-vasodilation and at rest showed no defects.  Normal

perfusion was noted.  Transient ischemic dilation ratio was found to be 1.02. 

However, gated studies showed markedly dilated left ventricle with dyskinetic

septum and the ejection fraction was calculated to be 11%.



FINAL IMPRESSION:

1.  The patient tolerated IV regadenoson well.

2.  No significant ischemic changes post vasodilation.

3.  Perfusion imaging was found to be normal.  However, ejection fraction was

found to be grossly abnormal in the range of 11%, severely reduced.  This may be

artifactual.  Would correlate with other modality like echocardiogram. 

Considering the ejection fraction, this is prognostically high risk study,

otherwise no significant ischemia noted.





DD: 12/11/2020 11:47

DT: 12/11/2020 21:49

JOB# [id number]  [id number]

DRK/CAMPOS

## 2020-12-13 VITALS — SYSTOLIC BLOOD PRESSURE: 131 MMHG | DIASTOLIC BLOOD PRESSURE: 80 MMHG

## 2020-12-13 RX ADMIN — MEGESTROL ACETATE SCH MG: 40 SUSPENSION ORAL at 09:40

## 2020-12-13 RX ADMIN — ASPIRIN SCH MG: 81 TABLET, CHEWABLE ORAL at 09:40

## 2020-12-13 RX ADMIN — HEPARIN SODIUM SCH UNIT: 5000 INJECTION, SOLUTION INTRAVENOUS; SUBCUTANEOUS at 13:18

## 2020-12-13 RX ADMIN — BENZONATATE SCH MG: 100 CAPSULE ORAL at 13:18

## 2020-12-13 RX ADMIN — LOSARTAN POTASSIUM SCH MG: 25 TABLET, FILM COATED ORAL at 09:40

## 2020-12-13 RX ADMIN — HEPARIN SODIUM SCH UNIT: 5000 INJECTION, SOLUTION INTRAVENOUS; SUBCUTANEOUS at 05:43

## 2020-12-13 RX ADMIN — METOPROLOL SUCCINATE SCH MG: 50 TABLET, EXTENDED RELEASE ORAL at 09:40

## 2020-12-13 RX ADMIN — BUMETANIDE SCH MG: 0.25 INJECTION, SOLUTION INTRAMUSCULAR; INTRAVENOUS at 05:43

## 2020-12-13 RX ADMIN — BENZONATATE SCH MG: 100 CAPSULE ORAL at 05:43

## 2020-12-13 RX ADMIN — Medication SCH ML: at 09:40

## 2020-12-13 NOTE — PROGRESS NOTE
Assessment and Plan





clinically improved and now euvolemic


tele unremarkable


cont current meds


long d/w regarding importance of medication compliance and lifestyle changes


f/u w us in office








- Patient Problems


(1) Acute HFrEF (heart failure with reduced ejection fraction)


Current Visit: Yes   Status: Acute   





(2) Hypertension


Current Visit: Yes   Status: Acute   





(3) SOB (shortness of breath) on exertion


Current Visit: Yes   Status: Acute   





(4) Cardiomyopathy


Current Visit: Yes   Status: Chronic   





Subjective


Principal diagnosis: HF


Interval history: 





doing much better


no sxs





Objective


                                   Vital Signs











  Temp Pulse Resp BP Pulse Ox


 


 12/13/20 04:40  97.6 F  79  20  131/92  96


 


 12/12/20 22:25  98.0 F  94 H  20  137/104  99


 


 12/12/20 20:00    18   98


 


 12/12/20 16:11  98.1 F  88  22  117/76  99


 


 12/12/20 11:35  97.5 F L  86  22  125/87  97














- Physical Examination


General: No Apparent Distress


HEENT: Positive: PERRL, Normocephaly, Mucus Membranes Moist


Neck: Positive: neck supple, trachea midline


Neuro: Positive: Grossly Intact


Abdomen: Negative: Tender


Skin: Negative: Rash


Musculoskeletal: No Pain


Extremities: Present: +2 Edema (BLE)





- Imaging and Cardiology


EKG: report reviewed, image reviewed


Echo: report reviewed (EF 25-30%, mod LVH, LA mod dilated, RV systolic function 

mildly reduced, RV mod dilated, RA mod dilated, mod MR, RVSP 48mmHg, trivial 

pericardial effusion, mod pleural effusion.)





- EKG


Sinus rhythms and dysrhythmias: sinus rhythm

## 2020-12-13 NOTE — DISCHARGE SUMMARY
Providers





- Providers


Date of Admission: 


12/09/20 11:41





Attending physician: 


AMOR LORENZO MD





                                        





12/08/20 22:39


Consult to Physician [CONS] Routine 


   Comment: 


   Consulting Provider: COURTNEY CUNNINGHAM


   Physician Instructions: 


   Reason For Exam: CHF- New Onset





12/10/20 12:10


Consult to Dietitian/Nutrition [CONS] Routine 


   Physician Instructions: HF diet, fluid restriction


   Reason For Exam: 


   Reason for Consult: Diet education





12/12/20 11:19


Consult to Case Management [CONS] Routine 


   Services Needed at Discharge: 


   Notified:: no


   Additional Physician Instructions: homeless











Primary care physician: 


PRIMARY CARE MD








Hospitalization


Reason for admission: CHF


Condition: Stable


Hospital course: 


60-year-old -American male with known history of hypertension presenting 

to the emergency room today complaining of shortness of breath, cough and lower 

extremity swelling which has been ongoing for about 2 weeks.  Symptoms were said

 to have gotten worse over the past few days and decided to report to the 

emergency room.  Cough has been nonproductive.  He denies any chest pain, no 

fever or chills, no headache or dizziness, no nausea vomiting, no abdominal 

pain, no hematuria or dysuria.  He denies any sick contacts and no recent 

travel, denies any contact with anyone with COVID-19.


Patient has been having orthopnea and has had to prop himself up on pillows at 

home in order to feel comfortable.  He has been wheezing occasionally.


Per patient he recently lost 40 pounds.  Diet and exercise but states that he 

feels this more due to his medical condition.  On admission his chest x-ray 

showed cardiomegaly with mild pulmonary vascular congestion and a proBNP of 2831


Work-up in the emergency room today reveals elevated BNP, chest x-ray showed 

cardiomegaly with pulmonary vascular congestion, EKG shows sinus tachycardia.





12/10: Patient seen and examined, no acute event overnight. TTE reviewed - EF 

25-30%, mod LVH, LA mod dilated, RV systolic function mildly reduced, RV mod 

dilated, RA mod dilated, mod MR, RVSP 48mmHg, trivial pericardial effusion, mod 

pleural effusion. Cardilology input noted with adjustments made to meds. 





12/11: Patient for stress test today, clinically is improving.  Discussed with 

cardiology 1-2 more days of diuretic to better optimize patient prior to 

discharge.  Stress test was negative. Start on Megace for appetite stimulant.





12/12: Continue Diuresis, patient now states that he is homeless, case 

management consulted. He also reports depression but no SI/HI, he states his PCP

 is aware and he will discuss more with him. I recommended Psych consult but he 

declined. 





12/13: Patient clinically improved ambulating without worsening shortness of 

breath sleeping supine.  Will follow with his primary care physician for all 

other management plans.





Acute congestive heart failure presumed systolic


Hypertension


Elevated blood sugar


Nausea and vomiting 


Medical noncompliance


Chronic pain syndrome


Weight loss of 40 pounds





Disposition: DC-01 TO HOME OR SELFCARE


Time spent for discharge: 35 MINS





Core Measure Documentation





- Palliative Care


Palliative Care/ Comfort Measures: Not Applicable





- Core Measures


Any of the following diagnoses?: heart failure





- Heart Failure Discharge Requirements


ACE/ARB for LVSD if EF <40%: Yes


Beta blocker at discharge: Yes





Exam





- Physical Exam


Narrative exam: 


VITAL SIGNS:  Reviewed.    Exam limited due to the global pandemic and effort to

 preserve PPE


GENERAL:  The patient appears normally developed, Vital signs as documented.


HEAD:  No signs of head trauma.


EYES:  Pupils are equal.  Extraocular motions intact.  


EARS:  Hearing grossly intact.


MOUTH:  Oropharynx is normal except for missing dentition. 


NECK:  No adenopathy, no JVD.  


CHEST:  Chest with diminished breath sounds bilaterally.  No wheezes, rales, or 

rhonchi.  


CARDIAC:  Regular rate and rhythm.  S1 and S2, without murmurs, gallops, or 

rubs.


VASCULAR: +1 edema.  Peripheral pulses normal and equal in all extremities.


ABDOMEN:  Soft, non tender and non distended.  No   rebound or guarding, and no 

masses palpated.   Bowel Sounds normal.


MUSCULOSKELETAL:  Good range of motion of all major joints. Extremities without 

clubbing, cyanosis.  +1 pitting edema bilateral lower extremity.  


NEUROLOGIC EXAM:  Alert and oriented x 3   No focal sensory or strength 

deficits.   Speech normal.  Follows commands.


PSYCHIATRIC:  Mood normal.


SKIN:  detail exam as documented in skin assessment








- Constitutional


Vitals: 


                                        











Temp Pulse Resp BP Pulse Ox


 


 97.6 F   79   20   131/92   96 


 


 12/13/20 04:40  12/13/20 04:40  12/13/20 04:40  12/13/20 04:40  12/13/20 04:40














Plan


Activity: advance as tolerated, fall precautions


Diet: low salt


Special Instructions: restrict fluid intake to (1000CC/DAY), record daily 

weights, record daily BP diary


Follow up with: 


PRIMARY CARE,MD [Primary Care Provider] - 3-5 Days


STEPH BRANTLEY MD [Staff Physician] - 7 Days


COURTNEY CUNNINGHAM MD [Staff Physician] - 7 Days


Prescriptions: 


Aspirin [Aspirin BABY CHEW TAB] 81 mg PO QDAY #30 tab.chew


Losartan [Cozaar] 25 mg PO QDAY #30 tablet


Furosemide [Lasix TAB] 40 mg PO BID #60 tablet


Metoprolol Xl [Metoprolol SUCCINATE ER TAB] 50 mg PO QDAY #30 tablet

## 2022-05-12 NOTE — EMERGENCY DEPARTMENT REPORT
ED General Adult HPI





- General


Chief complaint: Chest Pain


Stated complaint: HEART FAILURE/CP


Time Seen by Provider: 05/12/22 23:14


Source: patient


Mode of arrival: Ambulatory


Limitations: No Limitations





- History of Present Illness


Initial comments: 





Patient is 61 years old male with history of congestive heart failure and 

hypertension.  Patient is noncompliant with his medication.  Patient presented 

to the ER complaining of chest pain, tightness associated with shortness of 

breath especially when he walk.  Patient describes orthopnea also.  Patient 

denies any fever or chills.  No cough.  Patient also reporting bilateral lower 

extremity swelling.


Severity scale (0 -10): 7





- Related Data


                                  Previous Rx's











 Medication  Instructions  Recorded  Last Taken  Type


 


Oxycodone HCl/Acetaminophen 1 each PO Q6HR PRN #20 tablet 01/27/16 12/09/20 Rx





[Percocet 10/325 mg]    


 


Albuterol Mdi (or & Nicu Only) 1 puff IH Q4-6H PRN #1 inha 11/17/20 Unknown Rx





[ProAir HFA Inhaler]    


 


Aspirin [Aspirin BABY CHEW TAB] 81 mg PO QDAY #30 tab.chew 12/13/20 Unknown Rx


 


Furosemide [Lasix TAB] 40 mg PO BID #60 tablet 12/13/20 Unknown Rx


 


Losartan [Cozaar] 25 mg PO QDAY #30 tablet 12/13/20 Unknown Rx


 


Metoprolol Xl [Metoprolol 50 mg PO QDAY #30 tablet 12/13/20 Unknown Rx





SUCCINATE ER TAB]    











                                    Allergies











Allergy/AdvReac Type Severity Reaction Status Date / Time


 


ibuprofen [From Motrin] Allergy  Nausea Verified 01/27/16 08:49














ED Review of Systems


ROS: 


Stated complaint: HEART FAILURE/CP


Other details as noted in HPI





Comment: All other systems reviewed and negative


Constitutional: denies: chills, fever


Respiratory: orthopnea, shortness of breath, SOB with exertion, SOB at rest


Cardiovascular: chest pain, palpitations


Gastrointestinal: denies: abdominal pain, nausea, diarrhea, constipation, 

hematemesis, hematochezia


Musculoskeletal: denies: back pain


Neurological: denies: headache, weakness, numbness, paresthesias, confusion, 

abnormal gait





ED Past Medical Hx





- Past Medical History


Hx Hypertension: Yes


Hx Congestive Heart Failure: Yes


Hx Arthritis: Yes





- Social History


Smoking Status: Former Smoker


Substance Use Type: None





- Medications


Home Medications: 


                                Home Medications











 Medication  Instructions  Recorded  Confirmed  Last Taken  Type


 


Oxycodone HCl/Acetaminophen 1 each PO Q6HR PRN #20 tablet 01/27/16 12/11/20 12/09/20 Rx





[Percocet 10/325 mg]     


 


Albuterol Mdi (or & Nicu Only) 1 puff IH Q4-6H PRN #1 inha 11/17/20 12/11/20 

Unknown Rx





[ProAir HFA Inhaler]     


 


Aspirin [Aspirin BABY CHEW TAB] 81 mg PO QDAY #30 tab.chew 12/13/20  Unknown Rx


 


Furosemide [Lasix TAB] 40 mg PO BID #60 tablet 12/13/20  Unknown Rx


 


Losartan [Cozaar] 25 mg PO QDAY #30 tablet 12/13/20  Unknown Rx


 


Metoprolol Xl [Metoprolol 50 mg PO QDAY #30 tablet 12/13/20  Unknown Rx





SUCCINATE ER TAB]     














ED Physical Exam





- General


Limitations: No Limitations


General appearance: alert, in no apparent distress





- Head


Head exam: Present: atraumatic, normocephalic, normal inspection





- Eye


Eye exam: Present: normal appearance





- ENT


ENT exam: Present: normal exam, normal orophraynx, mucous membranes moist





- Neck


Neck exam: Present: normal inspection, full ROM.  Absent: tenderness, 

meningismus





- Respiratory


Respiratory exam: Present: decreased breath sounds.  Absent: respiratory 

distress, wheezes, rales, rhonchi





- Cardiovascular


Cardiovascular Exam: Present: tachycardia





- GI/Abdominal


GI/Abdominal exam: Present: soft, normal bowel sounds.  Absent: distended, 

tenderness, guarding, rebound, rigid, organomegaly, mass, bruit, pulsatile mass,

hernia





- Extremities Exam


Extremities exam: Present: pedal edema





- Back Exam


Back exam: Present: normal inspection, full ROM.  Absent: CVA tenderness (R), 

CVA tenderness (L)





- Neurological Exam


Neurological exam: Present: alert, oriented X3, CN II-XII intact, normal gait, 

reflexes normal.  Absent: motor sensory deficit





- Psychiatric


Psychiatric exam: Present: normal mood





- Skin


Skin exam: Present: warm, intact, normal color





ED Course


                                   Vital Signs











  05/12/22 05/12/22 05/12/22





  10:57 23:30 23:35


 


Temperature 98.0 F 98.3 F 


 


Pulse Rate 103 H 113 H 106 H


 


Respiratory 20 14 





Rate   


 


Blood Pressure 177/116  180/102


 


Blood Pressure 177/116 180/102 





[Left]   


 


O2 Sat by Pulse 100 100 





Oximetry   














  05/13/22 05/13/22





  02:05 02:07


 


Temperature 98.2 F 


 


Pulse Rate 82 


 


Respiratory 16 





Rate  


 


Blood Pressure  


 


Blood Pressure 157/102 





[Left]  


 


O2 Sat by Pulse 98 99





Oximetry  














ED Medical Decision Making





- Lab Data


Result diagrams: 


                                 05/12/22 23:20





                                 05/12/22 23:20





- EKG Data


-: EKG Interpreted by Me


EKG shows normal: sinus rhythm


Rate: normal





- EKG Data


Interpretation: no acute changes





- Radiology Data


Radiology results: report reviewed





- Medical Decision Making





Patient is 61 years old male with history of congestive heart failure and 

hypertension.  Patient is noncompliant with his medication.  Patient presented 

to the ER complaining of chest pain, tightness associated with shortness of 

breath especially when he walk.  Patient describes orthopnea also.  Patient 

denies any fever or chills.  No cough.  Patient also reporting bilateral lower 

extremity swelling.





EKG showed no ST elevation.  Labs reviewed and showed elevated BNP of 1300.  

Troponin is negative x2.  Patient is chest pain-free.  Chest x-ray showed 

pulmonary edema and small pleural effusion.  Patient received Lasix 60 mg IV and

 nitro.  Patient stated that he is feeling much better.  Patient had urine out

put so far more than 1000 mL.  I refilled patient medication and I strongly 

advised him to follow-up with his cardiologist in the next 2 to 3 days and to 

return to the ER if he develop any new symptoms.





Critical care attestation.: 


If time is entered above; I have spent that time in minutes in the direct care 

of this critically ill patient, excluding procedure time.








ED Disposition


Clinical Impression: 


 Acute exacerbation of CHF (congestive heart failure), Malignant hypertension





Disposition: 01 HOME / SELF CARE / HOMELESS


Is pt being admited?: No


Condition: Stable


Instructions:  Hypertension (ED), Heart Failure Exacerbation, Hypertension, 

Adult


Referrals: 


[facility] [Provider Group] - 3-5 Days

## 2022-05-12 NOTE — XRAY REPORT
CHEST 1 VIEW 5/12/2022 11:24 PM



INDICATION / CLINICAL INFORMATION: Chest Pain.



COMPARISON: 12/8/2020



FINDINGS:



SUPPORT DEVICES: None.

HEART / MEDIASTINUM: No significant abnormality. 

LUNGS / PLEURA: Possible trace left pleural effusion. No focal consolidation. No pneumothorax. 



ADDITIONAL FINDINGS: No significant additional findings.



IMPRESSION:

1. Possible trace left pleural effusion. No focal consolidation.



Signer Name: Beto Escobedo DO 

Signed: 5/12/2022 11:51 PM

Workstation Name: [id number]

## 2022-05-27 NOTE — XRAY REPORT
CHEST 2 VIEWS 



INDICATION / CLINICAL INFORMATION: chest pain. 



COMPARISON: Chest x-ray 5/12/2022



FINDINGS:



SUPPORT DEVICES: None.

HEART / MEDIASTINUM: No significant abnormality. 

LUNGS / PLEURA: No significant pulmonary or pleural abnormality. No pneumothorax. 

BONES: No significant osseous abnormality.

ADDITIONAL FINDINGS: No significant additional findings.



IMPRESSION:

1. No active cardiopulmonary disease.



Signer Name: Nick Pascual II, MD 

Signed: 5/27/2022 3:14 PM

Workstation Name: [id number]

## 2022-05-27 NOTE — VASCULAR LAB REPORT
DUPLEX DOPPLER LOWER EXTREMITY VEINS, LEFT



INDICATION:

EDEMA.



TECHNIQUE:

Duplex doppler imaging was performed through the veins of the left lower extremity using venous compr
ession and other maneuvers.



COMPARISON: 

No relevant prior imaging study available.



FINDINGS:

Left Common femoral vein: Negative.

Left Superficial femoral vein: Negative.

Left Popliteal vein: Negative.

Left Calf veins: Negative.



Additional findings: None..





IMPRESSION:

1. No sonographic evidence for DVT in the left lower extremity.



Signer Name: Myron Oneal MD 

Signed: 5/27/2022 6:26 PM

Workstation Name: [id number]

## 2022-05-27 NOTE — EMERGENCY DEPARTMENT REPORT
HPI





- General


Chief Complaint: Chest Pain


Time Seen by Provider: 05/27/22 16:50





- HPI


HPI: 





For the last week the patient has been experiencing dyspnea on exertion as well 

as orthopnea and increasing edema of his left lower extremity.  He has had 

increased edema of his left lower extremity for years and usually gets better on

diuretics.  He is currently homeless and has not had access to any of his 

medicines for a while now.  He reports also with this a generalized sensation of

chest pressure that been constant there for the last week.  He denies nausea 

vomiting diaphoresis fever chills focal weakness headache or any other 

associated symptoms.  Exercise makes it worse and nothing makes it better.  Has 

not taken any medications for this.





ED Past Medical Hx





- Past Medical History


Previous Medical History?: Yes


Hx Hypertension: Yes


Hx Congestive Heart Failure: Yes


Hx Arthritis: Yes





- Surgical History


Past Surgical History?: Yes





- Social History


Smoking Status: Former Smoker


Substance Use Type: None





- Medications


Home Medications: 


                                Home Medications











 Medication  Instructions  Recorded  Confirmed  Last Taken  Type


 


Oxycodone HCl/Acetaminophen 1 each PO Q6HR PRN #20 tablet 01/27/16 12/11/20 12/09/20 Rx





[Percocet 10/325 mg]     


 


Albuterol Mdi (or & Nicu Only) 1 puff IH Q4-6H PRN #1 inha 11/17/20 12/11/20 

Unknown Rx





[ProAir HFA Inhaler]     


 


Aspirin [Aspirin BABY CHEW TAB] 81 mg PO QDAY #30 tab.chew 12/13/20  Unknown Rx


 


Furosemide [Lasix TAB] 40 mg PO BID #60 tablet 12/13/20  Unknown Rx


 


Losartan [Cozaar] 25 mg PO QDAY #30 tablet 12/13/20  Unknown Rx


 


Metoprolol Xl [Metoprolol 50 mg PO QDAY #30 tablet 12/13/20  Unknown Rx





SUCCINATE ER TAB]     


 


Furosemide [Lasix TAB] 40 mg PO BID #60 tablet 05/13/22  Unknown Rx


 


Losartan [Cozaar] 25 mg PO QDAY #30 tablet 05/13/22  Unknown Rx


 


Metoprolol [Lopressor TAB] 25 mg PO BID #60 tablet 05/13/22  Unknown Rx


 


Furosemide [Lasix TAB] 20 mg PO QDAY PRN 7 Days #7 tablet 05/27/22  Unknown Rx


 


Losartan [Cozaar] 25 mg PO QDAY 30 Days #30 tablet 05/27/22  Unknown Rx














ED Review of Systems


ROS: 


Stated complaint: ADITYA/LEG SWOLLEN


Other details as noted in HPI





Other: 


All other systems reviewed and negative.





Physical Exam





- Physical Exam


Vital Signs: 


                                   Vital Signs











  05/27/22





  09:30


 


Temperature 98 F


 


Pulse Rate 84


 


Respiratory 16





Rate 


 


Blood Pressure 166/99





[Left] 


 


O2 Sat by Pulse 96





Oximetry 











Physical Exam: 





Physical Exam:


Constitutional: AAOX3. No acute distress. No diaphoresis. 


HENT: Normocephalic. Pupils equal and reactive. No throat edema or erythema.


Neck: No neck rigidity or tenderness.


Cardiovascular: 


Heart sounds: No murmur. Normal rate and regular rhythm. 


Pulses: Intact distal pulses. 


Lungs: No wheezing or rales. 


Chest wall: No tenderness. 


Abdominal: No distension. No mass/pulsatile mass. No abdominal tenderness, 

guarding nor rebound. 


Musculoskeletal: Normal range of motion.  There is edema of the left lower 

extremity which is pitting and +1.  There is no calf tenderness palpation.


Skin: Warm and dry. 


Neurological: Alert and oriented to person, place, and time. 


Psychiatric: Mood and affect normal. Normal cognition and memory. Normal 

judgement.








ED Course


                                   Vital Signs











  05/27/22





  09:30


 


Temperature 98 F


 


Pulse Rate 84


 


Respiratory 16





Rate 


 


Blood Pressure 166/99





[Left] 


 


O2 Sat by Pulse 96





Oximetry 














- Reevaluation(s)


Reevaluation #1: 





05/27/22 17:55


EKG done interpreted at 1005 shows a rate of 90, normal.  The rhythm is sinus 

rhythm, normal.  There is probable left atrial enlargement with lateral leads 

abnormal T waves which are inverted.


Reevaluation #2: 





05/27/22 18:57


The patient's chest x-ray was within normal limits.  His left lower extremity 

ultrasound also did not show any evidence of DVT.  His chemistries cardiac 

enzymes also were negative and a repeat troponin was also negative.  I will put 

him on oral Lasix for his left lower extremity to improve.  He will return if 

any other issues arise otherwise follow-up with his PCP





ED Medical Decision Making





- Lab Data


Result diagrams: 


                                 05/27/22 14:40





                                 05/27/22 14:40


Critical care attestation.: 


If time is entered above; I have spent that time in minutes in the direct care 

of this critically ill patient, excluding procedure time.








ED Disposition


Clinical Impression: 


 Dependent edema, Dyspnea, Pedal edema





Disposition: 01 HOME / SELF CARE / HOMELESS


Is pt being admited?: No


Does the pt Need Aspirin: No


Condition: Stable


Instructions:  Shortness of Breath, Adult, Easy-to-Read, Edema, Easy-to-Read


Prescriptions: 


Furosemide [Lasix TAB] 20 mg PO QDAY PRN 7 Days #7 tablet


 PRN Reason: swelling


Referrals: 


PRIMARY CARE,MD [Primary Care Provider] - 3-5 Days


Time of Disposition: 19:00


Print Language: ENGLISH

## 2022-05-31 NOTE — ELECTROCARDIOGRAPH REPORT
[facility]

                                       

Test Date:    2022               Test Time:    05:56:34

Pat Name:     ULISES CASH          Department:   

Patient ID:   [id number]          Room:          

Gender:       M                        Technician:   MB

:          1960               Requested By: ED DOC

Order Number: [id number]              Reading MD:   Oleg Blanchard

                                 Measurements

Intervals                              Axis          

Rate:         91                       P:            71

MA:           139                      QRS:          31

QRSD:         85                       T:            55

QT:           394                                    

QTc:          485                                    

                           Interpretive Statements

Sinus rhythm

Probable left atrial enlargement

Nonspecific T abnormalities, lateral leads

Compared to ECG 2022 10:05:58



Possible ischemia no longer present

T-wave abnormality still present

Electronically Signed On 2022 10:45:23 EDT by Oleg Blanchard

## 2022-05-31 NOTE — XRAY REPORT
CHEST 2 VIEWS 



INDICATION / CLINICAL INFORMATION: CHEST PAIN. 



COMPARISON: Chest x-ray 5/27/2022



FINDINGS:



SUPPORT DEVICES: None.

HEART / MEDIASTINUM: No significant abnormality. 

LUNGS / PLEURA: No significant pulmonary or pleural abnormality. No pneumothorax. 

BONES: No significant osseous abnormality.

ADDITIONAL FINDINGS: No significant additional findings.



IMPRESSION:

1. No active cardiopulmonary disease.



Signer Name: Nick Pascual II, MD 

Signed: 5/31/2022 7:52 AM

Workstation Name: [id number]

## 2022-06-01 NOTE — CONSULTATION
History of Present Illness





- Reason for Consult


Consult date: 22


Reason for consult: Mental health evaluation





- History of Present Psychiatric Illness


ED Note: 62 yo M with history of CHF and hypertension who now present with chest

pressure associated with left lower leg swelling and pain. He reports that he is

on disability since his wife  . He says he has been hearing voices 

intermittently to hurt himself. The latest was yesterday when the voice asked 

him to hit his head on the pole and he did. He also mentioned that the voice 

have asked him to jump in front of a moving vehicle in the past. Pt denies any 

cough or palpitation. He says things has not been the same since the death of 

his wife. No other modifying or associated factors reported.





The patient is a 61 year old male with history of cocaine use disorder, severe. 

The patient was seen today. In my encounter with the patient he is calm, alert 

and oriented x3. The patient reports on going depression and commanding auditory

hallucinations x2 weeks. The patient states sobriety of 27 years but relapsed 

after the death of his wife in ; he reports cocaine daily use, unable to 

specify amount but states " whatever I can get," he states he last used 

yesterday. The patient endorses suicidal ideation with a plan to " run into 

traffic, I'm tired of living like this." He reports commanding auditory 

hallucinations " voices telling me to harm myself." He denies visual 

hallucinations. 





PAST PSYCHIATRIC HISTORY:


Diagnoses:cocaine use disorder, severe


Suicide attempts or Self-harm behavior:Denies


Prior psychiatric hospitalizations: Yes


Substance Abuse history:  Cocaine, alcohol


Previous psychiatric medications tried:


Outpatient treatment:Unknown





PAST MEDICAL HISTORY: None reported or document





Family Psychiatric History: None reported or documented





SOCIAL HISTORY


Marital Status: single


Living Arrangements: Homeless


Employment Status:Disabled


Access to guns/weapons: Denies


Education:some 12th grade


History of Abuse: Denies


Legal History: Denies





REVIEW OF SYSTEMS


Constitutional: Negative for weight loss


ENT: Negative for stridor


Respiratory: Negative for cough or hemoptysis


All other systems reviewed and are negative


 


MENTAL STATUS EXAMINATION


General Appearance and Behavior: Age appropriate, wearing appropriate clothes, 

cooperative, polite with questioning, good eye contact, calm, polite


Cooperation: cooperative


Psychomotor Behavior: Psychomotor normal


Mood: Depressed


Affect and affective range: Congruent with stated mood


Thought Process: Goal directed


Thought Content:suicidal


Speech: Normal volume, Regular rate and rhythm 


Suicidal Ideation: Yes


Homicidal Ideation: Denies


Hallucination: Commanding Auditory


Delusions: None


Impulse Control: limited


Insight and Judgment: Limited


Memory:  intact


Attention: attentive


Orientation: Alert and oriented





Diagnoses: 


(1) Major depressive disorder 


(2)Cocaine use disorder, severe


Treatment Plan


 1013


Continue home meds


Sertraline 25mg po daily


Trazodone 50mg po QHS


PSYCHOTHERAPY: Supportive psychotherapy provided


MEDICAL: Per primary team


DELIRIUM PRECAUTIONS: Please re-orient patient frequently, keep lights on during

the day, and minimize benzodiazepines and opiates as these medications could 

worsen patient's confusion.


SAFETY SITTER: Per medical team


DISPOSITION: Recommend acute psychiatric inpatient treatment. 


Will follow. Thank you for the consult.  


Case staffed with Dr. Black








Medications and Allergies








Medications and Allergies


                                    Allergies











Allergy/AdvReac Type Severity Reaction Status Date / Time


 


ibuprofen [From Motrin] Allergy  Nausea Verified 16 08:49











                                Home Medications











 Medication  Instructions  Recorded  Confirmed  Last Taken  Type


 


Oxycodone HCl/Acetaminophen 1 each PO Q6HR PRN #20 tablet 16 Rx





[Percocet 10/325 mg]     


 


Albuterol Mdi (or & Nicu Only) 1 puff IH Q4-6H PRN #1 inha 20 

Unknown Rx





[ProAir HFA Inhaler]     


 


Aspirin [Aspirin BABY CHEW TAB] 81 mg PO QDAY #30 tab.chew 20  Unknown Rx


 


Furosemide [Lasix TAB] 40 mg PO BID #60 tablet 20  Unknown Rx


 


Losartan [Cozaar] 25 mg PO QDAY #30 tablet 20  Unknown Rx


 


Metoprolol Xl [Metoprolol 50 mg PO QDAY #30 tablet 20  Unknown Rx





SUCCINATE ER TAB]     


 


Furosemide [Lasix TAB] 40 mg PO BID #60 tablet 22  Unknown Rx


 


Losartan [Cozaar] 25 mg PO QDAY #30 tablet 22  Unknown Rx


 


Metoprolol [Lopressor TAB] 25 mg PO BID #60 tablet 22  Unknown Rx


 


Furosemide [Lasix TAB] 20 mg PO QDAY PRN 7 Days #7 tablet 22  Unknown Rx


 


Losartan [Cozaar] 25 mg PO QDAY 30 Days #30 tablet 22  Unknown Rx














Mental Status Exam





- Vital signs


                                Last Vital Signs











Temp  98.5 F   22 05:44


 


Pulse  95 H  22 05:44


 


Resp  18   22 05:44


 


BP  170/94   22 05:44


 


Pulse Ox  99   22 05:44














Results


Result Diagrams: 


                                 22 06:58





                                 22 06:58


                              Abnormal lab results











  22 Range/Units





  08:59 07:53 


 


D-Dimer   1680.69 H  (0-234)  ng/mlDDU


 


NT-Pro-B Natriuret Pep  1968 H   (0-900)  pg/mL








All other labs normal.

## 2022-06-01 NOTE — VASCULAR LAB REPORT
DUPLEX DOPPLER LOWER EXTREMITY VEINS, LEFT



INDICATION / CLINICAL INFORMATION:

Lower extremity swelling.



TECHNIQUE:

Duplex doppler imaging was performed through the veins of the left lower extremity using venous compr
ession and other maneuvers.



COMPARISON: 

5/27/2022



FINDINGS:



LEFT COMMON FEMORAL VEIN: Negative.

LEFT FEMORAL VEIN: Negative.

LEFT POPLITEAL VEIN: Acute thrombus.

LEFT CALF VEINS: Acute thrombus.



ADDITIONAL FINDINGS: None.



IMPRESSION:

1. Acute DVT within the left popliteal and calf veins.



The sonographer reported these findings to the referring physician at the conclusion of the exam.



Signer Name: Baltazar Faith MD 

Signed: 6/1/2022 10:46 AM

Workstation Name: [id number]

## 2022-06-01 NOTE — CAT SCAN REPORT
CTA CHEST WITH CONTRAST



INDICATION / CLINICAL INFORMATION: CP with elevated d-dimer  100 ML OMNI 350 .



TECHNIQUE: Axial CT images were obtained through the chest after injection of IV contrast. 3 plane MI
P and/or 3D reconstructions were produced. All CT scans at this location are performed using CT dose 
reduction for ALARA by means of automated exposure control. 



COMPARISON: None available.



FINDINGS:

PULMONARY EMBOLUS: None.

THORACIC AORTA: No significant abnormality. 

HEART: No significant abnormality.

CORONARY ARTERY CALCIFICATION: Absent -- None.

MEDIASTINUM / HALEY: No significant abnormality.

PLEURA: No pleural effusion. No pneumothorax.

LUNGS: No acute air space or interstitial disease.



ADDITIONAL FINDINGS: None.



UPPER ABDOMEN: No acute findings.



SKELETAL STRUCTURES: No significant osseous abnormality.



IMPRESSION:

1. No CT evidence for pulmonary embolism. 

2. No acute findings.



Signer Name: lAvaro Petty MD 

Signed: 6/1/2022 11:20 AM

Workstation Name: [email or web address]

## 2022-06-01 NOTE — EMERGENCY DEPARTMENT REPORT
<JAY ZAVALA - Last Filed: 22 20:38>





ED General Adult HPI





- General


Chief complaint: Chest Pain


Stated complaint: CHEST PAIN


Time Seen by Provider: 22 07:32





- Related Data


                                  Previous Rx's











 Medication  Instructions  Recorded  Last Taken  Type


 


Albuterol Mdi (or & Nicu Only) 1 puff IH Q4-6H PRN #1 inha 20 Unknown Rx





[ProAir HFA Inhaler]    


 


Aspirin [Aspirin BABY CHEW TAB] 81 mg PO QDAY #30 tab.chew 20 Unknown Rx


 


Furosemide [Lasix TAB] 40 mg PO BID #60 tablet 20 Unknown Rx


 


Losartan [Cozaar] 25 mg PO QDAY #30 tablet 20 Unknown Rx


 


Losartan [Cozaar] 25 mg PO QDAY #30 tablet 22 Unknown Rx


 


Metoprolol [Lopressor TAB] 25 mg PO BID #60 tablet 22 Unknown Rx


 


Furosemide [Lasix TAB] 20 mg PO QDAY PRN 7 Days #7 tablet 22 Unknown Rx


 


Rivaroxaban [Xarelto] 15 mg PO BID 21 Days #42 tablet 22 Unknown Rx


 


Rivaroxaban [Xarelto] 20 mg PO QDAY 30 Days #30 tab 22 Unknown Rx


 


Sertraline [Zoloft] 25 mg PO QDAY 30 Days #30 tab 22 Unknown Rx


 


traZODone [Desyrel] 50 mg PO QHS 30 Days #30 tab 22 Unknown Rx


 


Furosemide [Lasix TAB] 40 mg PO BID #60 tablet 22 Unknown Rx


 


Losartan [Cozaar] 25 mg PO QDAY 30 Days #30 tablet 22 Unknown Rx


 


Metoprolol Xl [Metoprolol 50 mg PO QDAY #30 tablet 22 Unknown Rx





SUCCINATE ER TAB]    











                                    Allergies











Allergy/AdvReac Type Severity Reaction Status Date / Time


 


ibuprofen [From Motrin] Allergy  Nausea Verified 16 08:49














ED Past Medical Hx





- Medications


Home Medications: 


                                Home Medications











 Medication  Instructions  Recorded  Confirmed  Last Taken  Type


 


Albuterol Mdi (or & Nicu Only) 1 puff IH Q4-6H PRN #1 inha 20 

Unknown Rx





[ProAir HFA Inhaler]     


 


Aspirin [Aspirin BABY CHEW TAB] 81 mg PO QDAY #30 tab.chew 20  Unknown Rx


 


Furosemide [Lasix TAB] 40 mg PO BID #60 tablet 20  Unknown Rx


 


Losartan [Cozaar] 25 mg PO QDAY #30 tablet 20  Unknown Rx


 


Losartan [Cozaar] 25 mg PO QDAY #30 tablet 22  Unknown Rx


 


Metoprolol [Lopressor TAB] 25 mg PO BID #60 tablet 22  Unknown Rx


 


Furosemide [Lasix TAB] 20 mg PO QDAY PRN 7 Days #7 tablet 22  Unknown Rx


 


Rivaroxaban [Xarelto] 15 mg PO BID 21 Days #42 tablet 22  Unknown Rx


 


Rivaroxaban [Xarelto] 20 mg PO QDAY 30 Days #30 tab 22  Unknown Rx


 


Sertraline [Zoloft] 25 mg PO QDAY 30 Days #30 tab 22  Unknown Rx


 


traZODone [Desyrel] 50 mg PO QHS 30 Days #30 tab 22  Unknown Rx


 


Furosemide [Lasix TAB] 40 mg PO BID #60 tablet 22  Unknown Rx


 


Losartan [Cozaar] 25 mg PO QDAY 30 Days #30 tablet 22  Unknown Rx


 


Metoprolol Xl [Metoprolol 50 mg PO QDAY #30 tablet 22  Unknown Rx





SUCCINATE ER TAB]     














ED Course





- Reevaluation(s)


Reevaluation #4: 





22 15:47


Patient was reevaluated.  Patient has been psych cleared as well.  Patient does 

have DVT and will be sent home with Xarelto prescription.  He should follow-up 

with primary care.





ED Medical Decision Making





- Lab Data


Result diagrams: 


                                 22 13:28





                                 22 13:28





ED Disposition


Clinical Impression: 


 Left leg swelling, Auditory hallucinations, Suicidal ideation





Chest pain


Qualifiers:


 Chest pain type: unspecified Qualified Code(s): R07.9 - Chest pain, unspecified





Deep vein thrombosis (DVT) of popliteal vein of left lower extremity


Qualifiers:


 Chronicity: acute Qualified Code(s): I82.432 - Acute embolism and thrombosis of

left popliteal vein





Disposition: 01 HOME / SELF CARE / HOMELESS


Is pt being admited?: No


Does the pt Need Aspirin: No


Condition: Stable


Instructions:  Rivaroxaban oral tablets, Nonspecific Chest Pain, Adult


Additional Instructions: 


Professional and Agency Contacts To help Resolve Crises ()


[place] Crisis Line: 1-683.811.4328


Suicide Prevention Line: 1-708.548.4111


Crisis Text Line: Text START to 310732


Emergency: 911





Outpatient COMMUNITY Behavioral Health Resources:


[place]:


[facility]


[address] [place], [place] [address]


Phone: [phone] 





[place]: 


[facility]  [facility]


[address] [place] [address] 


Phone: [phone]


Monday thru Friday - 8am - 5pm


**Call to schedule an assessment for mental health and substance abuse 

programs***





[place]


[facility]


Address: [address] [place] [address]


Monday thru Friday- 7am-2pm


Phone: [phone]





[facility]


Address: [address] [place] [address]


Monday thru Friday: 8:30AM-5PM


Phone: [phone]














Prescriptions: 


traZODone [Desyrel] 50 mg PO QHS 30 Days #30 tab


Rivaroxaban [Xarelto] 15 mg PO BID 21 Days #42 tablet


Rivaroxaban [Xarelto] 20 mg PO QDAY 30 Days #30 tab


Sertraline [Zoloft] 25 mg PO QDAY 30 Days #30 tab


Referrals: 


PRIMARY CARE,MD [Primary Care Provider] - 3-5 Days





<MARLON LANIER - Last Filed: 22 13:20>





ED General Adult HPI





- General


Source: patient


Mode of arrival: Ambulatory


Limitations: No Limitations





- History of Present Illness


Initial comments: 





62 yo M with history of CHF and hypertension who now present with chest pressure

associated with left lower leg swelling and pain. He reports that he is on d

isability since his wife  . He says he has been hearing voices 

intermittently to hurt himself. The latest was yesterday when the voice asked 

him to hit his head on the pole and he did. He also mentioned that the voice 

have asked him to jump in front of a moving vehicle in the past. Pt denies any 

cough or palpitation. He says things has not been the same since the death of 

his wife. No other modifying or associated factors reported. 





ED Review of Systems


ROS: 


Stated complaint: CHEST PAIN


Other details as noted in HPI





Comment: All other systems reviewed and negative


Cardiovascular: chest pain, edema (left lower leg ).  denies: palpitations


Psychiatric: auditory hallucinations, suicidal thoughts





ED Past Medical Hx





- Past Medical History


Hx Hypertension: Yes


Hx Congestive Heart Failure: Yes


Hx Arthritis: Yes





- Social History


Smoking Status: Former Smoker


Substance Use Type: None





ED Physical Exam





- General


Limitations: No Limitations


General appearance: alert, in no apparent distress





- Head


Head exam: Present: normal inspection





- Eye


Eye exam: Present: normal appearance


Pupils: Present: normal accommodation





- ENT


ENT exam: Present: normal exam, normal orophraynx, mucous membranes moist





- Neck


Neck exam: Present: normal inspection, full ROM.  Absent: tenderness





- Respiratory


Respiratory exam: Present: normal lung sounds bilaterally.  Absent: respiratory 

distress, accessory muscle use





- Cardiovascular


Cardiovascular Exam: Present: regular rate, normal rhythm, normal heart sounds





- GI/Abdominal


GI/Abdominal exam: Present: soft, normal bowel sounds.  Absent: tenderness





- Extremities Exam


Extremities exam: Present: tenderness (left calf with swellings and tenderness 

without erythema or warmth), pedal edema





- Back Exam


Back exam: Absent: tenderness





- Neurological Exam


Neurological exam: Present: alert, oriented X3





- Psychiatric


Psychiatric exam: Present: normal affect, normal mood





- Skin


Skin exam: Present: warm, normal color





ED Course


                                   Vital Signs











  22





  05:44 12:40 17:21


 


Temperature 98.5 F  


 


Pulse Rate 95 H 85 80


 


Respiratory 18 18 18





Rate   


 


Blood Pressure 170/94 145/69 


 


Blood Pressure   145/65





[Left]   


 


O2 Sat by Pulse 99 99 99





Oximetry   














  22





  20:11 03:49 09:59


 


Temperature 97.6 F 97.6 F 98.6 F


 


Pulse Rate 82 81 80


 


Respiratory 18 16 18





Rate   


 


Blood Pressure   


 


Blood Pressure 151/99 165/103 149/80





[Left]   


 


O2 Sat by Pulse 99 99 98





Oximetry   














- Reevaluation(s)


Reevaluation #1: 





22 07:51


here with chest pain with left lower leg swellings and tenderness --with 

mentioned SI with auditory hallucination-- will go ahead and check routing pysch

 labs including thryoid profile for any correctable cause-- D-dimer to rule out 

any DVT or PE as a cause. EKG and troponin for cardiac etiology. CBC, CMP and UA

 for any infectious process or electrolyte abnormality. 


Reevaluation #2: 





22 09:35


Noted with elevated D-dimer at 1968--worrisome for likely DVT and PE so we will 

go ahead and order Doppler ultrasound and CT angio chest to rule out PE--


Reevaluation #3: 





22 13:10


Doppler ultrasound of the left lower leg shows acute DVT within the left 

popliteal and calf vein--we will go ahead and start blood thinner--will go ahead

 and start Xarelto 15 mg BID x the next 21 days -- considering this patient to 

be 1013-- 





ED Medical Decision Making





- Lab Data


Result diagrams: 


                                 22 13:28





                                 22 13:28


Critical care attestation.: 


If time is entered above; I have spent that time in minutes in the direct care 

of this critically ill patient, excluding procedure time.








ED Disposition


Is pt being admited?: No


Does the pt Need Aspirin: No

## 2022-06-02 NOTE — PROGRESS NOTE
Subjective





- Reason for Consult


Consult date: 06/02/22


Reason for consult: mental health evaluation





- Chief Complaint


Chief complaint: 


The patient was seen today. He reports feeling better " I need to find a social 

worker or ." The patient presents with appropriate affec, he denies 

any current suicidal/homicidal ideation and denies hallucinations.





REVIEW OF SYSTEMS


Constitutional: Negative for weight loss


ENT: Negative for stridor


Respiratory: Negative for cough or hemoptysis


All other systems reviewed and are negative


 


MENTAL STATUS EXAMINATION


General Appearance and Behavior: Age appropriate, wearing appropriate clothes, 

cooperative, polite with questioning, good eye contact, calm, polite


Cooperation: cooperative


Psychomotor Behavior: Psychomotor normal


Mood: OK


Affect and affective range: Congruent with stated mood


Thought Process: Goal directed


Thought Content:reality oriented


Speech: Normal volume, Regular rate and rhythm 


Suicidal Ideation: denies


Homicidal Ideation: Denies


Hallucination: Denies


Delusions: None


Impulse Control: limited


Insight and Judgment: Limited


Memory:  intact


Attention: attentive


Orientation: Alert and oriented





Diagnoses: 


(1) Major depressive disorder 


(2)Cocaine use disorder, severe


Treatment Plan


 [id number]


Case management


Continue home meds


Sertraline 25mg po daily


Trazodone 50mg po QHS


PSYCHOTHERAPY: Supportive psychotherapy provided


MEDICAL: Per primary team


DELIRIUM PRECAUTIONS: Please re-orient patient frequently, keep lights on during

the day, and minimize benzodiazepines and opiates as these medications could 

worsen patient's confusion.


SAFETY SITTER: Per medical team


DISPOSITION: Do not recommend acute psychiatric inpatient treatment.  

will provide patient with outpatient resources.


Will sign off.  


Case staffed with Dr. Black








Medications and Allergies








Mental Status Exam





- Vital signs


                                Last Vital Signs











Temp  98.6 F   06/02/22 09:59


 


Pulse  80   06/02/22 09:59


 


Resp  18   06/02/22 09:59


 


BP  149/80   06/02/22 09:59


 


Pulse Ox  98   06/02/22 09:59

## 2022-06-03 NOTE — ELECTROCARDIOGRAPH REPORT
[facility]

                                       

Test Date:    2022               Test Time:    01:52:54

Pat Name:     ULISES CASH          Department:   

Patient ID:   [id number]          Room:          

Gender:       M                        Technician:   TECH

:          1960               Requested By: ED DOC

Order Number: [id number]              Reading MD:   Abhay Henry

                                 Measurements

Intervals                              Axis          

Rate:         77                       P:            62

TN:           146                      QRS:          4

QRSD:         86                       T:            68

QT:           418                                    

QTc:          475                                    

                           Interpretive Statements

Sinus rhythm

Left atrial enlargement

Anteroseptal infarct, age indeterminate

Compared to ECG 2022 05:56:34

Myocardial infarct finding now present

T-wave abnormality no longer present

Electronically Signed On 6-3-2022 8:56:47 EDT by Abhay Henry

## 2022-06-03 NOTE — EMERGENCY DEPARTMENT REPORT
ED General Adult HPI





- General


Chief complaint: Medical Clearance


Stated complaint: medical clearance


Time Seen by Provider: 22 06:06


Source: patient, EMS ( EMS documentation not available at time of chart 

dictation ), RN notes reviewed, old records reviewed


Mode of arrival: Ambulatory


Limitations: No Limitations





- History of Present Illness


Initial comments: 





The patient was evaluated in the emergency department for symptoms described in 

the history of present illness.  He/she was evaluated in the context of the 

global COVID-19 pandemic, which necessitated consideration that the patient 

might be at risk for infection with the virus that causes COVID-19.  

Institutional protocols and algorithms that pertain to the evaluation of 

patients at risk for COVID-19 are in a state of rapid change based on 

information released by regulatory bodies including the CDC and federal and 

state organizations.  These policies and algorithms were followed during the 

patient's care in the emergency department.  Please note that these policies, 

procedures and recommendations changed on a rapid basis.








This patient is a 61-year-old gentleman who presents to the department today 

with a request for medical clearance.  The patient was recently seen in this 

department for nonspecific chest pain and psychiatric symptoms.  He had a CTA 

chest which is negative for PE a few days ago, and a lower extremity DVT study 

which demonstrated a popliteal and calf vein DVT.  He was started on appropriate

medications.  He was given prescriptions for appropriate medications.  He also 

complains of nonspecific psychiatric symptoms, and was seen by our psychiatry 

team, who advised discharge.  The patient presents today because he reports that

he went to an outpatient psychiatric facility, and they stated that his blood 

pressure was too high.  He denies physical pain at this time.  He denies 

homicidality, suicidality, and overdose.  He has been intermittently compliant 

with his medications that were recently prescribed.





He reports that he had constant central chest wall pain yesterday, which did not

radiate to the back, arms or neck, and is now resolved.


-: days(s)


Improves with: none


Worsens with: none


Associated Symptoms: denies other symptoms





- Related Data


                                  Previous Rx's











 Medication  Instructions  Recorded  Last Taken  Type


 


Albuterol Mdi (or & Nicu Only) 1 puff IH Q4-6H PRN #1 inha 20 Unknown Rx





[ProAir HFA Inhaler]    


 


Aspirin [Aspirin BABY CHEW TAB] 81 mg PO QDAY #30 tab.chew 12/13/20 Unknown Rx


 


Furosemide [Lasix TAB] 40 mg PO BID #60 tablet 20 Unknown Rx


 


Losartan [Cozaar] 25 mg PO QDAY #30 tablet 20 Unknown Rx


 


Losartan [Cozaar] 25 mg PO QDAY #30 tablet 22 Unknown Rx


 


Metoprolol [Lopressor TAB] 25 mg PO BID #60 tablet 22 Unknown Rx


 


Furosemide [Lasix TAB] 20 mg PO QDAY PRN 7 Days #7 tablet 22 Unknown Rx


 


Rivaroxaban [Xarelto] 15 mg PO BID 21 Days #42 tablet 22 Unknown Rx


 


Rivaroxaban [Xarelto] 20 mg PO QDAY 30 Days #30 tab 22 Unknown Rx


 


Sertraline [Zoloft] 25 mg PO QDAY 30 Days #30 tab 22 Unknown Rx


 


traZODone [Desyrel] 50 mg PO QHS 30 Days #30 tab 22 Unknown Rx


 


Furosemide [Lasix TAB] 40 mg PO BID #60 tablet 22 Unknown Rx


 


Losartan [Cozaar] 25 mg PO QDAY 30 Days #30 tablet 22 Unknown Rx


 


Metoprolol Xl [Metoprolol 50 mg PO QDAY #30 tablet 22 Unknown Rx





SUCCINATE ER TAB]    











                                    Allergies











Allergy/AdvReac Type Severity Reaction Status Date / Time


 


ibuprofen [From Motrin] Allergy  Nausea Verified 16 08:49














ED Review of Systems


ROS: 


Stated complaint: CHEST PAIN & GROIN PAIN


Other details as noted in HPI





Constitutional: denies: fever


Eyes: denies: eye discharge


ENT: denies: epistaxis


Respiratory: denies: wheezing


Cardiovascular: chest pain.  denies: syncope


Gastrointestinal: denies: abdominal pain


Psychiatric: denies: auditory hallucinations, visual hallucinations, homicidal 

thoughts, suicidal thoughts





ED Past Medical Hx





- Past Medical History


Previous Medical History?: Yes


Hx Hypertension: Yes


Hx Congestive Heart Failure: Yes


Hx Arthritis: Yes





- Surgical History


Past Surgical History?: No





- Social History


Smoking Status: Unknown if ever smoked





- Medications


Home Medications: 


                                Home Medications











 Medication  Instructions  Recorded  Confirmed  Last Taken  Type


 


Albuterol Mdi (or & Nicu Only) 1 puff IH Q4-6H PRN #1 inha 20 

Unknown Rx





[ProAir HFA Inhaler]     


 


Aspirin [Aspirin BABY CHEW TAB] 81 mg PO QDAY #30 tab.chew 20  Unknown Rx


 


Furosemide [Lasix TAB] 40 mg PO BID #60 tablet 20  Unknown Rx


 


Losartan [Cozaar] 25 mg PO QDAY #30 tablet 20  Unknown Rx


 


Losartan [Cozaar] 25 mg PO QDAY #30 tablet 22  Unknown Rx


 


Metoprolol [Lopressor TAB] 25 mg PO BID #60 tablet 22  Unknown Rx


 


Furosemide [Lasix TAB] 20 mg PO QDAY PRN 7 Days #7 tablet 22  Unknown Rx


 


Rivaroxaban [Xarelto] 15 mg PO BID 21 Days #42 tablet 22  Unknown Rx


 


Rivaroxaban [Xarelto] 20 mg PO QDAY 30 Days #30 tab 22  Unknown Rx


 


Sertraline [Zoloft] 25 mg PO QDAY 30 Days #30 tab 22  Unknown Rx


 


traZODone [Desyrel] 50 mg PO QHS 30 Days #30 tab 22  Unknown Rx


 


Furosemide [Lasix TAB] 40 mg PO BID #60 tablet 22  Unknown Rx


 


Losartan [Cozaar] 25 mg PO QDAY 30 Days #30 tablet 22  Unknown Rx


 


Metoprolol Xl [Metoprolol 50 mg PO QDAY #30 tablet 22  Unknown Rx





SUCCINATE ER TAB]     














ED Physical Exam





- General


Limitations: No Limitations


General appearance: alert, in no apparent distress





- Head


Head exam: Present: atraumatic, normocephalic





- Eye


Eye exam: Present: normal appearance, EOMI.  Absent: nystagmus





- ENT


ENT exam: Present: normal exam, normal orophraynx, mucous membranes moist, 

normal external ear exam





- Neck


Neck exam: Present: normal inspection, full ROM.  Absent: tenderness, 

meningismus





- Respiratory


Respiratory exam: Present: normal lung sounds bilaterally.  Absent: respiratory 

distress, wheezes, rales, rhonchi, stridor, decreased breath sounds





- Cardiovascular


Cardiovascular Exam: Present: regular rate, normal rhythm, normal heart sounds. 

Absent: bradycardia, tachycardia, irregular rhythm, systolic murmur, diastolic 

murmur, rubs, gallop





- GI/Abdominal


GI/Abdominal exam: Present: soft.  Absent: distended, tenderness, guarding, 

rebound, rigid, pulsatile mass





- Rectal


Rectal exam: Present: deferred





- Extremities Exam


Extremities exam: Present: normal inspection, full ROM, pedal edema, other (2+ 

pulses noted in the bilateral upper and lower extremities.  There is no palpable

cord.   negative Homans sign.  Muscular compartments are soft.  The pelvis is 

stable.).  Absent: calf tenderness





- Back Exam


Back exam: Present: normal inspection.  Absent: tenderness, CVA tenderness (R), 

CVA tenderness (L), paraspinal tenderness, vertebral tenderness





- Neurological Exam


Neurological exam: Present: alert, oriented X3, other (No facial droop.  Tongue 

midline.  Extraocular movements intact bilaterally.  Facial sensation intact to 

light touch in V1, V2, V3 distribution bilaterally.  5 and a 5 strength in 4 

extremities.  Sensation intact to light touch in 4 extremities.).  Absent: motor

sensory deficit





- Psychiatric


Psychiatric exam: Present: normal affect, normal mood.  Absent: homicidal 

ideation, suicidal ideation





- Skin


Skin exam: Present: warm, dry, intact, normal color.  Absent: rash





ED Course


                                   Vital Signs











  22





  01:22 07:08 08:14


 


Temperature 98.4 F  


 


Pulse Rate 74 80 


 


Respiratory 18  





Rate   


 


Blood Pressure 162/79 152/90 


 


O2 Sat by Pulse 98  





Oximetry   


 


O2 Sat by Pulse   99





Oximetry [   





Digit-Finger]   














- Pulse Oximetry Interpretation


  ** Digit-Finger


Initial Pulse Oximetry Readin


O2 Sat by Pulse Oximetry: 99


Actions Taken: none





ED Medical Decision Making





- Lab Data


Result diagrams: 


                                 22 07:09





                                 22 07:09








                                   Vital Signs











  22





  01:22 07:08


 


Temperature 98.4 F 


 


Pulse Rate 74 80


 


Respiratory 18 





Rate  


 


Blood Pressure 162/79 152/90


 


O2 Sat by Pulse 98 





Oximetry  














- EKG Data


-: EKG Interpreted by Me


EKG shows normal: sinus rhythm


Rate: normal





- EKG Data





22 07:40


The EKG is interpreted at 07: 4 0





This is a sinus rhythm, with a rate of 77 bpm.  There is a borderline leftward 

axis deviation, there is atrial enlargement, and left ventricular hypertrophy.  

There is motion artifact.  This is an abnormal EKG.  This is not a STEMI.  This 

EKG appears to be essentially unchanged when compared to prior EKG from ,  





- Radiology Data


Radiology results: pending, report reviewed, image reviewed





[address] [address] 

Vascular Lab Report Signed Patient: ULISES CASH MR#: [id number] : 

1960 Acct:[id number] Age/Sex: 61 / M ADM Date: 22 Loc: ED 

Attending Dr: Ordering Physician: MARLON LANIER Date of Service: 22 

Procedure(s): VL venous duplex LE LT Accession Number(s): [id number] cc: MARLON LANIER DUPLEX DOPPLER LOWER EXTREMITY VEINS, LEFT INDICATION / CLINICAL 

INFORMATION: Lower extremity swelling. TECHNIQUE: Duplex doppler imaging was 

performed through the veins of the left lower extremity using venous compression

and other maneuvers. COMPARISON: 2022 FINDINGS: LEFT COMMON FEMORAL VEIN: 

Negative. LEFT FEMORAL VEIN: Negative. LEFT POPLITEAL VEIN: Acute thrombus. LEFT

CALF VEINS: Acute thrombus. ADDITIONAL FINDINGS: None. IMPRESSION: 1. Acute DVT 

within the left popliteal and calf veins. The sonographer reported these 

findings to the referring physician at the conclusion of the exam. Signer Name: 

Baltazar Faith MD Signed: 2022 10:46 AM Workstation Name: [id number] 

Transcribed By:  Dictated By: Baltazar Faith MD Electronically Authenticated 

By: Baltazar Faith MD Signed Date/Time: 22 1046 DD/DT: 22 1043 








[address] [address] Cat

Scan Report Signed Patient: ULISES CASH MR#: [id number] : 1960 

Acct:[id number] Age/Sex: 61 / M ADM Date: 22 Loc: ED Attending Dr: 

Ordering Physician: MARLON LANIER Date of Service: 22 Procedure(s): CT 

angio chest Accession Number(s): [id number] cc: MARLON LANIER CTA CHEST WITH 

CONTRAST INDICATION / CLINICAL INFORMATION: CP with elevated d-dimer 100 ML OMNI

350 . TECHNIQUE: Axial CT images were obtained through the chest after injection

of IV contrast. 3 plane MIP and/or 3D reconstructions were produced. All CT 

scans at this location are performed using CT dose reduction for ALARA by means 

of automated exposure control. COMPARISON: None available. FINDINGS: PULMONARY 

EMBOLUS: None. THORACIC AORTA: No significant abnormality. HEART: No significant

abnormality. CORONARY ARTERY CALCIFICATION: Absent -- None. MEDIASTINUM / HALEY: 

No significant abnormality. PLEURA: No pleural effusion. No pneumothorax. LUNGS:

No acute air space or interstitial disease. ADDITIONAL FINDINGS: None. UPPER 

ABDOMEN: No acute findings. SKELETAL STRUCTURES: No significant osseous 

abnormality. IMPRESSION: 1. No CT evidence for pulmonary embolism. 2. No acute 

findings. Signer Name: Alvaro Petty MD Signed: 2022 11:20 AM Workstation 

Name: [email or web address] Transcribed By: HOA Dictated By: Alvaro Petty MD 

Electronically Authenticated By: Alvaro Petty MD Signed Date/Time: 22 

1120 DD/DT: 22 











[facility] [address] [place] [address] 

XRay Report Signed Patient: ULISES ACSH MR#: [id number] : 1960 

Acct:[id number] Age/Sex: 61 / M ADM Date: 22 Loc: ED Attending Dr: 

Ordering Physician: TAMIE MATUTE MD Date of Service: 22 Procedure(s): XR chest 

routine 2V Accession Number(s): [id number] cc: ED MD JUJU Fluoro Time In Minutes: 

CHEST 2 VIEWS INDICATION / CLINICAL INFORMATION: CHEST PAIN. COMPARISON: Chest 

x-ray 2022 FINDINGS: SUPPORT DEVICES: None. HEART / MEDIASTINUM: No 

significant abnormality. LUNGS / PLEURA: No significant pulmonary or pleural 

abnormality. No pneumothorax. BONES: No significant osseous abnormality. 

ADDITIONAL FINDINGS: No significant additional findings. IMPRESSION: 1. No 

active cardiopulmonary disease. Signer Name: Kendra Petersen II, MD Signed:

2022 7:52 AM Workstation Name: [id number] Transcribed By: HUYEN Dictated By:

KENDRA PETERSEN II, MD Electronically Authenticated By: KENDRA PETERSEN II, MD

Signed Date/Time: 22 075 DD/DT: 22 





- Medical Decision Making





Differential diagnosis, including not limited to: Hypertension, medical 

screening examination, behavioral health screening examination





Assessment and plan: 61-year-old gentleman, who is afebrile, with reassuring 

vital signs, clinically sober, with a GCS of 15, who is not currently 

tachycardic, tachypneic or hypoxic, who does not meet criteria for 1013 hold or 

involuntary confinement, presenting essentially for medical clearance and 

medical screening examination.  Laboratory studies are essentially 

nonactionable.  Troponin negative x1, has essentially been chest pain-free for 

at least 12 hours.  EKG unchanged from prior, and on my initial assessment, the 

patient is sleeping comfortably in his stretcher, saturating 100% on room air, 

and in no acute distress.  He had a thorough and recent extensive work-up 

performed in this department, CT scan of the chest is negative for acute 

findings.  Hypertension is chronic, and not acutely decompensated.  We will 

continue the patient's current outpatient blood pressure medications.  He was 

recently provided prescriptions for his lower extremity DVT by one of my 

colleagues.





He is low risk for major adverse cardiac event as per heart score.





He does not appear to have an emergent medical or psychiatric condition at this 

time.  We will refill his antihypertensive therapy, and he may be discharged to 

follow-up as an outpatient with resources.  He is observed in this department 

for hours without clinical decompensation


Critical care attestation.: 


If time is entered above; I have spent that time in minutes in the direct care 

of this critically ill patient, excluding procedure time.








ED Disposition


Clinical Impression: 


 Nonspecific chest pain, Elevated blood pressure reading, Medication refill, 

Encounter for behavioral health screening





Disposition: 01 HOME / SELF CARE / HOMELESS


Is pt being admited?: No


Does the pt Need Aspirin: No


Condition: Good


Instructions:  Rivaroxaban oral tablets, Nonspecific Chest Pain, Adult


Additional Instructions: 


Please take the prescribed blood pressure medications as directed.  Please 

continue the Xarelto prescriptions that were recently provided for the patient.





Please follow-up with an outpatient cardiologist within the next week.  Please 

follow-up with an outpatient primary care doctor within the next 2 weeks.  He is

to follow-up with the outpatient resources that were recently provided to the 

patient.  The patient is not found to have an emergent medical or psychiatric co

ndition while present here in the emergency room today.





Patient will receive a good Rx affordable prescription card.





Please return to the emergency room right away with new pain, worsened pain, 

migration of pain, projectile vomiting, change in mental status, confusion, 

inability tolerate liquid feeds, new, worsened or different symptoms not present

on the initial emergency room evaluation


Prescriptions: 


Losartan [Cozaar] 25 mg PO QDAY 30 Days #30 tablet


Furosemide [Lasix TAB] 40 mg PO BID #60 tablet


Metoprolol Xl [Metoprolol SUCCINATE ER TAB] 50 mg PO QDAY #30 tablet


Referrals: 


[facility] [Provider Group] - 3-5 Days


[facility] [Provider Group] - 3-5 Days

## 2022-07-21 NOTE — EMERGENCY DEPARTMENT REPORT
Blank Doc





- Documentation


Documentation: 





62-year-old male who presents with chest pain.





1- This is a initial triage assessment/medical screening only. Full assessment 

and work-up will be completed once the patient is in proper hospital gown, ED 

bed and in a private room setting.  This initial assessment/diagnostic or

ders/clinical plan/ treatment(s) is/are subject to change based on pt's health 

status, clinical progression and re-assessment by fellow clinical providers in 

the ED. Further treatment and workup at subsequent clinical providers 

discretion. Patient/guardians urged not to elope from ED as their condition may 

be serious if not clinically assessed and managed. 


2-cardiac workup








The patient was evaluated in the emergency department for symptoms described in 

the history of present illness.  He/she was evaluated in the context of the 

global COVID-19 pandemic, which necessitated consideration that the patient 

might be at risk for infection with the virus that causes COVID-19.  

Institutional protocols and algorithms that pertain to the evaluation of 

patients at risk for COVID-19 are in a state of rapid change based on 

information released by regulatory bodies including the CDC and federal and 

state organizations.  These policies and algorithms were followed during the 

patient's care in the emergency department.  Please note that these policies, 

procedures and recommendations changed on a rapid basis.

## 2022-07-21 NOTE — XRAY REPORT
XR chest routine 2V



INDICATION / CLINICAL INFORMATION: Chest Pain.



COMPARISON: 5/31/2022



FINDINGS:



SUPPORT DEVICES: None.

HEART /PULMONARY VASCULATURE: No significant abnormality. 

LUNGS / PLEURA: No significant pulmonary or pleural abnormality. No pneumothorax. 



ADDITIONAL FINDINGS: No significant additional findings.



IMPRESSION:

1. No acute findings.



Signer Name: Gerhard Conti MD 

Signed: 7/21/2022 12:39 PM

Workstation Name: [id number]

## 2022-07-21 NOTE — EMERGENCY DEPARTMENT REPORT
HPI





- General


Chief Complaint: Chest Pain


PUI?: No


Time Seen by Provider: 07/21/22 11:26





- HPI


HPI: 





This patient is a 62-year-old male with multiple medical comorbidities who 

presents for evaluation of shortness of breath.  Triage note endorses that the 

patient is complaining of chest pain but the patient clarifies to this provider 

that he feels chest "tightness" in the setting of feeling as though he is having

problems breathing.  He reports swelling in his lower extremities, left greater 

than right which she states is typical for him when he has flareup of his 

congestive heart failure.  He states that he is not working and does not have a 

primary care doctor and so unless he comes to the ER, he does not have access to

his routine medications.  Patient also reports that several weeks ago he was 

seen in the emergency department and diagnosed with a left lower extremity DVT. 

He states he was discharged to home with oral anticoagulation but due to 

inability to afford these medications, he stopped taking them.  Reports several 

days of worsening shortness of breath and difficulty breathing, primarily on 

exertion.








He has not taken any pain medication for symptoms.  He denies any upper 

respiratory tract infection symptoms.  No recent prolonged travel history 

immobilization.  Pain currently 0-10.





ED Past Medical Hx





- Past Medical History


Hx Hypertension: Yes


Hx Congestive Heart Failure: Yes


Hx Arthritis: Yes


Additional medical history: bronchitis





- Social History


Smoking Status: Former Smoker


Substance Use Type: None





- Medications


Home Medications: 


                                Home Medications











 Medication  Instructions  Recorded  Confirmed  Last Taken  Type


 


Albuterol Mdi (or & Nicu Only) 1 puff IH Q4-6H PRN #1 inha 11/17/20 12/11/20 

Unknown Rx





[ProAir HFA Inhaler]     


 


Aspirin [Aspirin BABY CHEW TAB] 81 mg PO QDAY #30 tab.chew 12/13/20  Unknown Rx


 


Losartan [Cozaar] 25 mg PO QDAY #30 tablet 05/13/22  Unknown Rx


 


Furosemide [Lasix TAB] 20 mg PO QDAY PRN 7 Days #7 tablet 05/27/22  Unknown Rx


 


Rivaroxaban [Xarelto] 15 mg PO BID 21 Days #42 tablet 06/02/22  Unknown Rx


 


Sertraline [Zoloft] 25 mg PO QDAY 30 Days #30 tab 06/02/22  Unknown Rx


 


traZODone [Desyrel] 50 mg PO QHS 30 Days #30 tab 06/02/22  Unknown Rx


 


Furosemide [Lasix TAB] 40 mg PO BID #60 tablet 06/03/22  Unknown Rx


 


Losartan [Cozaar] 25 mg PO QDAY 30 Days #30 tablet 06/03/22  Unknown Rx


 


Metoprolol Xl [Metoprolol 50 mg PO QDAY #30 tablet 06/03/22  Unknown Rx





SUCCINATE ER TAB]     


 


Furosemide [Lasix TAB] 40 mg PO BID #60 tablet 07/21/22  Unknown Rx


 


Losartan [Cozaar] 25 mg PO QDAY #30 tablet 07/21/22  Unknown Rx


 


Metoprolol [Lopressor TAB] 25 mg PO BID #60 tablet 07/21/22  Unknown Rx


 


Rivaroxaban [Xarelto] 20 mg PO QDAY 30 Days #30 tab 07/21/22  Unknown Rx














ED Review of Systems


ROS: 


Stated complaint: SOB,CHEST PAIN,PAIN IN LEFT ARM HISTORY OF CHF


Other details as noted in HPI





Comment: All other systems reviewed and negative


Constitutional: no symptoms reported


Eyes: as per HPI


ENT: as per HPI


Respiratory: no symptoms reported, shortness of breath


Cardiovascular: as per HPI, chest pain, dyspnea on exertion, edema


Gastrointestinal: denies: abdominal pain, nausea, vomiting, diarrhea, 

constipation, hematemesis, melena, hematochezia


Genitourinary: denies: urgency, dysuria, frequency, hematuria, discharge, 

testicular pain, testicular mass, other


Musculoskeletal: denies: back pain, joint swelling, arthralgia, myalgia


Skin: other (Edema to lower extremities, left greater than right).  denies: 

rash, lesions, change in color, change in hair/nails, pruritus


Neurological: denies: headache, weakness, numbness, paresthesias, confusion, 

abnormal gait, vertigo, other


Psychiatric: denies: anxiety, depression, auditory hallucinations, visual chris

ucinations, homicidal thoughts, suicidal thoughts


Hematological/Lymphatic: denies: easy bleeding, easy bruising





Physical Exam





- Physical Exam


Vital Signs: 


                                   Vital Signs











  07/21/22 07/21/22 07/21/22





  11:17 16:54 17:01


 


Temperature 98.1 F  


 


Pulse Rate 90 102 H 104 H


 


Respiratory 18 19 26 H





Rate   


 


Blood Pressure   203/113


 


Blood Pressure 169/108  





[Right]   


 


O2 Sat by Pulse 100 98 100





Oximetry   














  07/21/22 07/21/22 07/21/22





  17:15 17:31 17:37


 


Temperature   


 


Pulse Rate 97 H 93 H 


 


Respiratory 20 19 





Rate   


 


Blood Pressure 203/113 203/113 


 


Blood Pressure   





[Right]   


 


O2 Sat by Pulse 100 100 98





Oximetry   











General: 





Gen: pt is well appearing, no acute distress


HEENT: Normocephalic atraumatic pupils equally round and reactive to light ex

traocular muscles intact sclera anicteric


Neck: Full range of motion, no midline spinal tenderness palpation, no JVD, no 

carotid bruits, no nuchal rigidity


CVS: S1-S2 regular rate and rhythm with no gallops rubs or murmurs, chest wall 

nontender


Pulmonary: Clear to auscultation bilaterally, no wheezes rales or rhonchi


Abdomen: Soft nondistended nontender no guarding or rebound tenderness, no 

palpable deformities or step-offs, normal active bowel sounds, no 

hepatosplenomegaly, no pulsatile masses


: Deferred


Extremities: No cyanosis no clubbing no edema, intact distal peripheral pulses,,

 patient has nonpitting edema to his left lower extremity, no visible edema to 

right lower extremity, 


Integumentary: Skin normal, no petechia no purpura no abscess no lacerations no 

evidence of trauma no evidence of infection


Neuro: Patient is awake alert and oriented to person place time situation, menta

ting well, cranial nerves II through XII intact, no focal neurodeficits, 

sensation grossly tact


Psych: Calm cooperative, mood affect normal





ED Course


                                   Vital Signs











  07/21/22 07/21/22 07/21/22





  11:17 16:54 17:01


 


Temperature 98.1 F  


 


Pulse Rate 90 102 H 104 H


 


Respiratory 18 19 26 H





Rate   


 


Blood Pressure   203/113


 


Blood Pressure 169/108  





[Right]   


 


O2 Sat by Pulse 100 98 100





Oximetry   














  07/21/22 07/21/22 07/21/22





  17:15 17:31 17:37


 


Temperature   


 


Pulse Rate 97 H 93 H 


 


Respiratory 20 19 





Rate   


 


Blood Pressure 203/113 203/113 


 


Blood Pressure   





[Right]   


 


O2 Sat by Pulse 100 100 98





Oximetry   














- Reevaluation(s)


Reevaluation #1: 





07/21/22 19:59


Patient is comfortable and well-appearing.  He has been ordered for dose of 

furosemide intravenously for management of acute decompensated heart failure


Reevaluation #2: 





07/21/22 20:49


Patient is comfortable and well-appearing.  States he has a follow-up 

appointment with his primary care doctor within 1 week.  Patient is requesting 

to be given temporary refill of medications so that he will have medications to 

take while he is awaiting formal evaluation by his primary care doctor.  As a 

courtesy prescriptions will be written for patient as discussed his vitals are 

stable.  He denies any complaints.  Plan will be to discharge patient





ED Medical Decision Making





- Lab Data


Result diagrams: 


                                 07/21/22 12:16





                                 07/21/22 12:16





- EKG Data


-: EKG Interpreted by Me


EKG shows normal: sinus rhythm


Rate: normal





- EKG Data


When compared to previous EKG there are: no significant change


Interpretation: no acute changes, normal EKG





- Radiology Data


Radiology results: report reviewed





- Medical Decision Making





This is a 62-year-old male with multiple medical comorbidities who presents for 

evaluation of shortness of breath and chest tightness times several days.  Vital

 stable.  Patient has pitting edema to bilateral lower extremities.  Labs 

reviewed.  D-dimer elevated.  BNP elevated.  Your cardiac enzymes unremarkable. 

 Chest x-ray grossly unremarkable.  CTA chest performed to rule out acute 

pulmonary embolism.  The patient has no acute pulmonary embolism, he has mild 

congestive heart failure.  He was given furosemide intravenously here with good 

diuresis.  Overall he remains well-appearing.  He has no dyspnea tachypnea or 

hypoxia necessitating further intravenous diuretics and there is no evidence at 

this time that he requires inpatient hospitalization for management of his 

symptoms.





Had multiple extensive conversations with the patient at his bedside concerning 

his compliance and adherence to his blood pressure and diuretic medication 

management.  Patient states he is scheduled to see his primary care doctor 

within 1 week.  As a courtesy patient's blood pressure medication as well as 

furosemide were written for him.  He is stable for discharge to home.  For 

discharge she was given strict verbal and written return precautions.  Patient 

verbalized understanding and agreement the plan of care.


Critical care attestation.: 


If time is entered above; I have spent that time in minutes in the direct care 

of this critically ill patient, excluding procedure time.








ED Disposition


Clinical Impression: 


 Congestive heart failure





Disposition: 01 HOME / SELF CARE / HOMELESS


Is pt being admited?: No


Does the pt Need Aspirin: No


Condition: Stable


Instructions:  Low-Sodium Eating Plan, Heart Failure, Diagnosis, Easy-to-Read, 

Heart Failure, Diagnosis, Preventing Heart Failure


Additional Instructions: 


It is extremely important that you see your primary care doctor within 1 week f

or reassessment.  You will need to discuss with your primary care doctor low 

cost alternatives to your regular medications, if they exist, so that you can 

continue to be able to afford your medications.  This is very important.





Take your medications as prescribed.





Observe your symptoms very carefully.  Return to the nearest emergency 

department as soon as possible if you develop chest pain, shortness of breath, 

difficulty breathing, palpitations, inability tolerate liquids or solids, or if 

any other new worrisome symptoms develop.


Prescriptions: 


Losartan [Cozaar] 25 mg PO QDAY #30 tablet


Furosemide [Lasix TAB] 40 mg PO BID #60 tablet


Metoprolol [Lopressor TAB] 25 mg PO BID #60 tablet


Rivaroxaban [Xarelto] 20 mg PO QDAY 30 Days #30 tab


Referrals: 


PRIMARY CARE,MD [Primary Care Provider] - 3-5 Days

## 2022-07-21 NOTE — CAT SCAN REPORT
CTA CHEST WITH CONTRAST



INDICATION / CLINICAL INFORMATION: hx of dvt, p/w chest pain, sob, eval for PE.



TECHNIQUE: Axial CT images were obtained through the chest after injection of IV contrast. 3 plane MI
P and/or 3D reconstructions were produced. All CT scans at this location are performed using CT dose 
reduction for ALARA by means of automated exposure control. 



COMPARISON: 6/1/2022



FINDINGS:

PULMONARY EMBOLUS: None.

THORACIC AORTA: Mild atherosclerotic calcification without acute abnormality. 

HEART: Mild global cardiomegaly.

CORONARY ARTERY CALCIFICATION: Present -- Mild.

MEDIASTINUM / HALEY: No significant abnormality.

PLEURA: Tiny bilateral pleural effusions. No pneumothorax.

LUNGS: Mild interstitial edema.



ADDITIONAL FINDINGS: None.



UPPER ABDOMEN: No acute findings.



SKELETAL STRUCTURES: No significant osseous abnormality.



IMPRESSION:

1. No CT evidence for pulmonary embolism. 

2. Findings likely indicating mild CHF as above.



Signer Name: Alvaro Petty MD 

Signed: 7/21/2022 8:12 PM

Workstation Name: [id number]

## 2022-07-22 NOTE — ELECTROCARDIOGRAPH REPORT
[facility]

                                       

Test Date:    2022               Test Time:    11:22:18

Pat Name:     ULISES CASH          Department:   

Patient ID:   [id number]          Room:          

Gender:       M                        Technician:   NURSE

:          1960               Requested By: DAVI FRIED

Order Number: [id number]              Reading MD:   Yusef Munoz

                                 Measurements

Intervals                              Axis          

Rate:         102                      P:            41

WA:           149                      QRS:          32

QRSD:         91                       T:            66

QT:           384                                    

QTc:          501                                    

                           Interpretive Statements

Sinus tachycardia

Probable left atrial enlargement

Probable anteroseptal infarct, old

Nonspecific T wave abnormality

Compared to ECG 2022 01:52:54

No significant change

Electronically Signed On 2022 14:07:27 EDT by Yusef Munoz